# Patient Record
Sex: MALE | Race: WHITE | NOT HISPANIC OR LATINO | Employment: FULL TIME | URBAN - METROPOLITAN AREA
[De-identification: names, ages, dates, MRNs, and addresses within clinical notes are randomized per-mention and may not be internally consistent; named-entity substitution may affect disease eponyms.]

---

## 2017-10-03 ENCOUNTER — OFFICE VISIT (OUTPATIENT)
Dept: FAMILY MEDICINE | Facility: CLINIC | Age: 37
End: 2017-10-03

## 2017-10-03 VITALS
DIASTOLIC BLOOD PRESSURE: 94 MMHG | HEART RATE: 93 BPM | SYSTOLIC BLOOD PRESSURE: 122 MMHG | OXYGEN SATURATION: 76 % | TEMPERATURE: 98.1 F | HEIGHT: 75 IN | WEIGHT: 215 LBS | BODY MASS INDEX: 26.73 KG/M2

## 2017-10-03 DIAGNOSIS — Z01.818 PRE-OPERATIVE GENERAL PHYSICAL EXAMINATION: Primary | ICD-10-CM

## 2017-10-03 DIAGNOSIS — Z23 NEED FOR VACCINATION: ICD-10-CM

## 2017-10-03 DIAGNOSIS — G89.29 CHRONIC RIGHT SHOULDER PAIN: ICD-10-CM

## 2017-10-03 DIAGNOSIS — B35.1 DERMATOPHYTOSIS OF NAIL: ICD-10-CM

## 2017-10-03 DIAGNOSIS — M25.511 CHRONIC RIGHT SHOULDER PAIN: ICD-10-CM

## 2017-10-03 DIAGNOSIS — Z71.89 ACP (ADVANCE CARE PLANNING): ICD-10-CM

## 2017-10-03 LAB
ERYTHROCYTE [DISTWIDTH] IN BLOOD BY AUTOMATED COUNT: 11.5 %
HCT VFR BLD AUTO: 48 % (ref 40–53)
HEMOGLOBIN: 14.9 G/DL (ref 13.3–17.7)
MCH RBC QN AUTO: 29 PG (ref 26–33)
MCHC RBC AUTO-ENTMCNC: 31 G/DL (ref 31–36)
MCV RBC AUTO: 93 FL (ref 78–100)
PLATELET COUNT - QUEST: 302 10^9/L (ref 150–375)
RBC # BLD AUTO: 5.13 10*12/L (ref 4.4–5.9)
WBC # BLD AUTO: 4.3 10*9/L (ref 4–11)

## 2017-10-03 PROCEDURE — 90686 IIV4 VACC NO PRSV 0.5 ML IM: CPT | Performed by: PHYSICIAN ASSISTANT

## 2017-10-03 PROCEDURE — 36415 COLL VENOUS BLD VENIPUNCTURE: CPT | Performed by: PHYSICIAN ASSISTANT

## 2017-10-03 PROCEDURE — 85027 COMPLETE CBC AUTOMATED: CPT | Performed by: PHYSICIAN ASSISTANT

## 2017-10-03 PROCEDURE — 99214 OFFICE O/P EST MOD 30 MIN: CPT | Mod: 25 | Performed by: PHYSICIAN ASSISTANT

## 2017-10-03 PROCEDURE — 90471 IMMUNIZATION ADMIN: CPT | Performed by: PHYSICIAN ASSISTANT

## 2017-10-03 NOTE — PROGRESS NOTES
[facility]  [address]  [address]  [facility] [address]  [phone]  Dept: [phone]    PRE-OP EVALUATION:  Today's date: 10/3/2017    Cory Mcpherson (: 1980) presents for pre-operative evaluation assessment as requested by Dr. Finley.  He requires evaluation and anesthesia risk assessment prior to undergoing surgery/procedure for treatment of Right shoulder .  Proposed procedure: Right shoulder scope acromioplasty labral repair possible rotator cuff repair & bicep tendon    Date of Surgery/ Procedure: 10/11/2017  Time of Surgery/ Procedure: 6:30 am   Hospital/Surgical Facility[facility]  Fax number for surgical facility: [phone] attn Zulema LOAIZA  Primary Physician: Francoise Contreras  Type of Anesthesia Anticipated: to be determined    Patient has a Health Care Directive or Living Will:  NO    1. NO - Do you have a history of heart attack, stroke, stent, bypass or surgery on an artery in the head, neck, heart or legs?  2. NO - Do you ever have any pain or discomfort in your chest?  3. NO - Do you have a history of  Heart Failure?  4. NO - Are you troubled by shortness of breath when: walking on the level, up a slight hill or at night?  5. NO - Do you currently have a cold, bronchitis or other respiratory infection?  6. NO - Do you have a cough, shortness of breath or wheezing?  7. NO - Do you sometimes get pains in the calves of your legs when you walk?  8. NO - Do you or anyone in your family have previous history of blood clots?  9. NO - Do you or does anyone in your family have a serious bleeding problem such as prolonged bleeding following surgeries or cuts?  10. NO - Have you ever had problems with anemia or been told to take iron pills?  11. NO - Have you had any abnormal blood loss such as black, tarry or bloody stools, or abnormal vaginal bleeding?  12. NO - Have you ever had a blood transfusion?  13. NO - Have you or any of  your relatives ever had problems with anesthesia?  14. NO - Do you have sleep apnea, excessive snoring or daytime drowsiness?  15. NO - Do you have any prosthetic heart valves?  16. NO - Do you have prosthetic joints?          HPI:                                                      Brief HPI related to upcoming procedure: Pain in right shoulder - no trauma.  Started 4-5 years ago.       See problem list for active medical problems.  Problems all longstanding and stable, except as noted/documented.  See ROS for pertinent symptoms related to these conditions.                                                                                                  .    MEDICAL HISTORY:                                                      Patient Active Problem List    Diagnosis Date Noted     Undiagnosed cardiac murmurs      Priority: Medium     Other acne 06/24/1999     Priority: Medium     ACP (advance care planning) 10/03/2017     Priority: Low     Advance Care Planning 10/3/2017: ACP Review of Chart / Resources Provided:  Reviewed chart for advance care plan.  Cory Mcpherson has no plan or code status on file. Discussed available resources and provided with information.   Added by [facility] 01/22/2013     Priority: Low     State Tier Level:  Tier 0  Status:  n/a  Care Coordinator:  See Letters for [facility] Care Plan              Past Medical History:   Diagnosis Date     Essential hypertension, benign 8/4/2008     Other acne      Past Surgical History:   Procedure Laterality Date     NO HISTORY OF SURGERY       Current Outpatient Prescriptions   Medication Sig Dispense Refill     FINASTERIDE PO Take 1 mg by mouth       fluticasone (FLONASE) 50 MCG/ACT nasal spray Spray 1-2 sprays into both nostrils daily. 1 Package 11     OTC products: none    Allergies   Allergen Reactions     Grass      No Known Drug Allergies       Latex Allergy: NO    Social History   Substance Use Topics     Smoking  "status: Never Smoker     Smokeless tobacco: Never Used     Alcohol use 2.0 oz/week     4 drink(s) per week     History   Drug Use No       REVIEW OF SYSTEMS:                                                    Constitutional, neuro, ENT, endocrine, pulmonary, cardiac, gastrointestinal, genitourinary, musculoskeletal, integument and psychiatric systems are negative, except as otherwise noted.    Continues to have thickening of toenails R> L      EXAM:                                                    BP (!) 122/94 (BP Location: Left arm, Patient Position: Chair, Cuff Size: Adult Large)  Pulse 93  Temp 98.1  F (36.7  C) (Oral)  Ht 1.899 m (6' 2.75\")  Wt 97.5 kg (215 lb)  SpO2 (!) 76%  BMI 27.05 kg/m2    GENERAL APPEARANCE: healthy, alert and no distress     EYES: EOMI,  PERRL     HENT: ear canals and TM's normal and nose and mouth without ulcers or lesions     NECK: no adenopathy, no asymmetry, masses, or scars and thyroid normal to palpation     RESP: lungs clear to auscultation - no rales, rhonchi or wheezes     CV: regular rates and rhythm, normal S1 S2, no S3 or S4 and no murmur, click or rub     ABDOMEN:  soft, nontender, no HSM or masses and bowel sounds normal     MS: extremities normal- no gross deformities noted, no evidence of inflammation in joints, FROM in all extremities.     SKIN: no suspicious lesions or rashes - there is hyperkeratosis of bilateral 1st toe nails     NEURO: Normal strength and tone, sensory exam grossly normal, mentation intact and speech normal     PSYCH: mentation appears normal. and affect normal/bright    DIAGNOSTICS:                                                      Labs Resulted Today:   Results for orders placed or performed in visit on 10/03/17   HEMOGRAM/PLATELET (BFP)   Result Value Ref Range    WBC 4.3 4.0 - 11 10*9/L    RBC Count 5.13 4.4 - 5.9 10*12/L    Hemoglobin 14.9 13.3 - 17.7 g/dL    Hematocrit 48.0 40.0 - 53.0 %    MCV 93.0 78 - 100 fL    MCH 29.0 26 - 33 pg "    MCHC 31.0 31 - 36 g/dL    RDW 11.5 %    Platelet Count 302 150 - 375 10^9/L       Recent Labs   Lab Test  01/22/13   0915  01/22/13   0914  09/04/09   0400   HGB   --   14.9   --    PLT   --   356   --    NA  140   --   138   POTASSIUM  4.6   --   4.2   CR  0.95   --   1.06        IMPRESSION:                                                    Reason for surgery/procedure: right shoulder arthroscopy  Diagnosis/reason for consult:  Preoperative clearance      The proposed surgical procedure is considered INTERMEDIATE risk.    REVISED CARDIAC RISK INDEX  The patient has the following serious cardiovascular risks for perioperative complications such as (MI, PE, VFib and 3  AV Block):  No serious cardiac risks  INTERPRETATION: 0 risks: Class I (very low risk - 0.4% complication rate)    The patient has the following additional risks for perioperative complications:  No identified additional risks      ICD-10-CM    1. Pre-operative general physical examination Z01.818 HEMOGRAM/PLATELET (BFP)     VENOUS COLLECTION   2. Need for vaccination Z23 DTAP HEPB & POLIO VIRUS, INACTIVATED (<7Y),     VACCINE ADMINISTRATION, EACH ADDITIONAL   3. ACP (advance care planning) Z71.89    4. Dermatophytosis of nail B35.1 PODIATRY/FOOT & ANKLE SURGERY REFERRAL   5. Chronic right shoulder pain M25.511     G89.29        RECOMMENDATIONS:                                                          APPROVAL GIVEN to proceed with proposed procedure, without further diagnostic evaluation       Schedule appt with podiatry post-surgically    Signed Electronically by: Francoise Contreras PA-C    Copy of this evaluation report is provided to requesting physician.    [facility] Preop Guidelines

## 2017-10-03 NOTE — MR AVS SNAPSHOT
After Visit Summary   10/3/2017    Cory Mcpherson    MRN: [id number]           Patient Information     Date Of Birth          1980        Visit Information        Provider Department      10/3/2017 8:30 AM Francoise Contreras PA [facility]        Today's Diagnoses     Pre-operative general physical examination    -  1    Need for vaccination        ACP (advance care planning)        Dermatophytosis of nail        Chronic right shoulder pain           Follow-ups after your visit        Additional Services     PODIATRY/FOOT & ANKLE SURGERY REFERRAL       Your provider has referred you to: [facility] [phone]   [email or web address]    Please be aware that coverage of these services is subject to the terms and limitations of your health insurance plan.  Call member services at your health plan with any benefit or coverage questions.      Please bring the following to your appointment:  >>   Any x-rays, CTs or MRIs which have been performed.  Contact the facility where they were done to arrange for  prior to your scheduled appointment.  Any new CT, MRI or other procedures ordered by your specialist must be performed at a [place] facility or coordinated by your clinic's referral office.    >>   List of current medications   >>   This referral request   >>   Any documents/labs given to you for this referral                  Who to contact     If you have questions or need follow up information about today's clinic visit or your schedule please contact [facility] directly at [phone].  Normal or non-critical lab and imaging results will be communicated to you by MyChart, letter or phone within 4 business days after the clinic has received the results. If you do not hear from us within 7 days, please contact the clinic through MyChart or phone. If you have a critical or abnormal lab result,  "we will notify you by phone as soon as possible.  Submit refill requests through [employer] or call your pharmacy and they will forward the refill request to us. Please allow 3 business days for your refill to be completed.          Additional Information About Your Visit        [employer] Information     [employer] gives you secure access to your electronic health record. If you see a primary care provider, you can also send messages to your care team and make appointments. If you have questions, please call your primary care clinic.  If you do not have a primary care provider, please call [phone] and they will assist you.        Care EveryWhere ID     This is your Care EveryWhere ID. This could be used by other organizations to access your [place] medical records  [id number]        Your Vitals Were     Pulse Temperature Height Pulse Oximetry BMI (Body Mass Index)       93 98.1  F (36.7  C) (Oral) 1.899 m (6' 2.75\") 76% 27.05 kg/m2        Blood Pressure from Last 3 Encounters:   10/03/17 (!) 122/94   07/02/13 120/80   01/22/13 120/78    Weight from Last 3 Encounters:   10/03/17 97.5 kg (215 lb)   07/02/13 100.7 kg (222 lb)   01/22/13 100.2 kg (221 lb)              We Performed the Following     HC FLU VAC PRESRV FREE QUAD SPLIT VIR 3+YRS IM     HEMOGRAM/PLATELET (BFP)     PODIATRY/FOOT & ANKLE SURGERY REFERRAL     VACCINE ADMINISTRATION, EACH ADDITIONAL     VENOUS COLLECTION          Today's Medication Changes          These changes are accurate as of: 10/3/17 10:38 AM.  If you have any questions, ask your nurse or doctor.               Stop taking these medicines if you haven't already. Please contact your care team if you have questions.     mometasone 50 MCG/ACT spray   Commonly known as:  NASONEX   Stopped by:  Francoise Contreras PA                    Primary Care Provider Office Phone # Fax #    JOVANY Nowak [phone] [address]  [facility] [address]      "   Equal Access to Services     [facility] : Hadii aad ku hadcortneymarielle Marionali, washereenda luqadaha, qazainta jaysonmadelinealejandrina alvarez. So [facility] [phone].    ATENCIÓN: Si habla español, tiene a wilkinson disposición servicios gratuitos de asistencia lingüística. Llame al [phone].    We comply with applicable federal civil rights laws and [place] laws. We do not discriminate on the basis of race, color, national origin, age, disability, sex, sexual orientation, or gender identity.            Thank you!     Thank you for choosing [facility]  for your care. Our goal is always to provide you with excellent care. Hearing back from our patients is one way we can continue to improve our services. Please take a few minutes to complete the written survey that you may receive in the mail after your visit with us. Thank you!             Your Updated Medication List - Protect others around you: Learn how to safely use, store and throw away your medicines at www.disposemymeds.org.          This list is accurate as of: 10/3/17 10:38 AM.  Always use your most recent med list.                   Brand Name Dispense Instructions for use Diagnosis    FINASTERIDE PO      Take 1 mg by mouth        fluticasone 50 MCG/ACT spray    FLONASE    1 Package    Spray 1-2 sprays into both nostrils daily.    Allergic rhinitis, cause unspecified

## 2017-10-03 NOTE — LETTER
[facility]  [address]  [address]  [facility] [address]  [phone]  Dept: [phone]    PRE-OP EVALUATION:  Today's date: 10/3/2017    Cory Mcpherson (: 1980) presents for pre-operative evaluation assessment as requested by Dr. Finley.  He requires evaluation and anesthesia risk assessment prior to undergoing surgery/procedure for treatment of Right shoulder .  Proposed procedure: Right shoulder scope acromioplasty labral repair possible rotator cuff repair & bicep tendon    Date of Surgery/ Procedure: 10/11/2017  Time of Surgery/ Procedure: 6:30 am   [facility]  Fax number for surgical facility: [phone] attn Zulema LOAIZA  Primary Physician: Francoise Contreras  Type of Anesthesia Anticipated: to be determined    Patient has a Health Care Directive or Living Will:  NO    1. NO - Do you have a history of heart attack, stroke, stent, bypass or surgery on an artery in the head, neck, heart or legs?  2. NO - Do you ever have any pain or discomfort in your chest?  3. NO - Do you have a history of  Heart Failure?  4. NO - Are you troubled by shortness of breath when: walking on the level, up a slight hill or at night?  5. NO - Do you currently have a cold, bronchitis or other respiratory infection?  6. NO - Do you have a cough, shortness of breath or wheezing?  7. NO - Do you sometimes get pains in the calves of your legs when you walk?  8. NO - Do you or anyone in your family have previous history of blood clots?  9. NO - Do you or does anyone in your family have a serious bleeding problem such as prolonged bleeding following surgeries or cuts?  10. NO - Have you ever had problems with anemia or been told to take iron pills?  11. NO - Have you had any abnormal blood loss such as black, tarry or bloody stools, or abnormal vaginal bleeding?  12. NO - Have you ever had a blood transfusion?  13. NO - Have you or any of  your relatives ever had problems with anesthesia?  14. NO - Do you have sleep apnea, excessive snoring or daytime drowsiness?  15. NO - Do you have any prosthetic heart valves?  16. NO - Do you have prosthetic joints?          HPI:                                                      Brief HPI related to upcoming procedure: Pain in right shoulder - no trauma.  Started 4-5 years ago.       See problem list for active medical problems.  Problems all longstanding and stable, except as noted/documented.  See ROS for pertinent symptoms related to these conditions.                                                                                                  .    MEDICAL HISTORY:                                                      Patient Active Problem List    Diagnosis Date Noted     Undiagnosed cardiac murmurs      Priority: Medium     Other acne 06/24/1999     Priority: Medium     ACP (advance care planning) 10/03/2017     Priority: Low     Advance Care Planning 10/3/2017: ACP Review of Chart / Resources Provided:  Reviewed chart for advance care plan.  Cory Mcpherson has no plan or code status on file. Discussed available resources and provided with information.   Added by [facility] 01/22/2013     Priority: Low     State Tier Level:  Tier 0  Status:  n/a  Care Coordinator:  See Letters for [facility] Care Plan              Past Medical History:   Diagnosis Date     Essential hypertension, benign 8/4/2008     Other acne      Past Surgical History:   Procedure Laterality Date     NO HISTORY OF SURGERY       Current Outpatient Prescriptions   Medication Sig Dispense Refill     FINASTERIDE PO Take 1 mg by mouth       fluticasone (FLONASE) 50 MCG/ACT nasal spray Spray 1-2 sprays into both nostrils daily. 1 Package 11     OTC products: none    Allergies   Allergen Reactions     Grass      No Known Drug Allergies       Latex Allergy: NO    Social History   Substance Use Topics     Smoking  "status: Never Smoker     Smokeless tobacco: Never Used     Alcohol use 2.0 oz/week     4 drink(s) per week     History   Drug Use No       REVIEW OF SYSTEMS:                                                    Constitutional, neuro, ENT, endocrine, pulmonary, cardiac, gastrointestinal, genitourinary, musculoskeletal, integument and psychiatric systems are negative, except as otherwise noted.    Continues to have thickening of toenails R> L      EXAM:                                                    BP (!) 122/94 (BP Location: Left arm, Patient Position: Chair, Cuff Size: Adult Large)  Pulse 93  Temp 98.1  F (36.7  C) (Oral)  Ht 1.899 m (6' 2.75\")  Wt 97.5 kg (215 lb)  SpO2 (!) 76%  BMI 27.05 kg/m2    GENERAL APPEARANCE: healthy, alert and no distress     EYES: EOMI,  PERRL     HENT: ear canals and TM's normal and nose and mouth without ulcers or lesions     NECK: no adenopathy, no asymmetry, masses, or scars and thyroid normal to palpation     RESP: lungs clear to auscultation - no rales, rhonchi or wheezes     CV: regular rates and rhythm, normal S1 S2, no S3 or S4 and no murmur, click or rub     ABDOMEN:  soft, nontender, no HSM or masses and bowel sounds normal     MS: extremities normal- no gross deformities noted, no evidence of inflammation in joints, FROM in all extremities.     SKIN: no suspicious lesions or rashes - there is hyperkeratosis of bilateral 1st toe nails     NEURO: Normal strength and tone, sensory exam grossly normal, mentation intact and speech normal     PSYCH: mentation appears normal. and affect normal/bright    DIAGNOSTICS:                                                      Labs Resulted Today:   Results for orders placed or performed in visit on 10/03/17   HEMOGRAM/PLATELET (BFP)   Result Value Ref Range    WBC 4.3 4.0 - 11 10*9/L    RBC Count 5.13 4.4 - 5.9 10*12/L    Hemoglobin 14.9 13.3 - 17.7 g/dL    Hematocrit 48.0 40.0 - 53.0 %    MCV 93.0 78 - 100 fL    MCH 29.0 26 - 33 pg "    MCHC 31.0 31 - 36 g/dL    RDW 11.5 %    Platelet Count 302 150 - 375 10^9/L       Recent Labs   Lab Test  01/22/13   0915  01/22/13   0914  09/04/09   0400   HGB   --   14.9   --    PLT   --   356   --    NA  140   --   138   POTASSIUM  4.6   --   4.2   CR  0.95   --   1.06        IMPRESSION:                                                    Reason for surgery/procedure: right shoulder arthroscopy  Diagnosis/reason for consult:  Preoperative clearance      The proposed surgical procedure is considered INTERMEDIATE risk.    REVISED CARDIAC RISK INDEX  The patient has the following serious cardiovascular risks for perioperative complications such as (MI, PE, VFib and 3  AV Block):  No serious cardiac risks  INTERPRETATION: 0 risks: Class I (very low risk - 0.4% complication rate)    The patient has the following additional risks for perioperative complications:  No identified additional risks      ICD-10-CM    1. Pre-operative general physical examination Z01.818 HEMOGRAM/PLATELET (BFP)     VENOUS COLLECTION   2. Need for vaccination Z23 DTAP HEPB & POLIO VIRUS, INACTIVATED (<7Y),     VACCINE ADMINISTRATION, EACH ADDITIONAL   3. ACP (advance care planning) Z71.89    4. Dermatophytosis of nail B35.1 PODIATRY/FOOT & ANKLE SURGERY REFERRAL   5. Chronic right shoulder pain M25.511     G89.29        RECOMMENDATIONS:                                                          APPROVAL GIVEN to proceed with proposed procedure, without further diagnostic evaluation       Schedule appt with podiatry post-surgically    Signed Electronically by: Francoise Contreras PA-C    Copy of this evaluation report is provided to requesting physician.    [facility] Preop Guidelines

## 2017-11-14 ENCOUNTER — OFFICE VISIT (OUTPATIENT)
Dept: PODIATRY | Facility: CLINIC | Age: 37
End: 2017-11-14
Payer: COMMERCIAL

## 2017-11-14 VITALS
SYSTOLIC BLOOD PRESSURE: 139 MMHG | HEIGHT: 75 IN | HEART RATE: 87 BPM | BODY MASS INDEX: 26.73 KG/M2 | DIASTOLIC BLOOD PRESSURE: 85 MMHG | WEIGHT: 215 LBS

## 2017-11-14 DIAGNOSIS — B35.1 ONYCHOMYCOSIS: Primary | ICD-10-CM

## 2017-11-14 PROCEDURE — 99203 OFFICE O/P NEW LOW 30 MIN: CPT | Performed by: PODIATRIST

## 2017-11-14 PROCEDURE — 84450 TRANSFERASE (AST) (SGOT): CPT | Performed by: PODIATRIST

## 2017-11-14 PROCEDURE — 36415 COLL VENOUS BLD VENIPUNCTURE: CPT | Performed by: PODIATRIST

## 2017-11-14 PROCEDURE — 84460 ALANINE AMINO (ALT) (SGPT): CPT | Performed by: PODIATRIST

## 2017-11-14 RX ORDER — IBUPROFEN 800 MG/1
TABLET, FILM COATED ORAL
Refills: 0 | COMMUNITY
Start: 2017-10-11 | End: 2023-07-11

## 2017-11-14 NOTE — MR AVS SNAPSHOT
After Visit Summary   11/14/2017    Cory Mcpherson    MRN: [id number]           Patient Information     Date Of Birth          1980        Visit Information        Provider Department      11/14/2017 10:30 AM Richmond Raza DPM [facility]        Today's Diagnoses     Onychomycosis    -  1      Care Instructions    NAIL FUNGUS / ONYCHOMYCOSIS   Nail fungus is not a hygiene problem and will not likely lead to significant medical   problems. The nails may get thick causing pain and possibly local skin infection.   Treatments include debridement (trimming), oral antifungals, topical antifungals and complete removal of the nail. Most fungal nails are not treated.   Topicals such as tea tree oil can be helpful for surface fungus and may, at best, limit   progression. Over the counter creams (such as Lamisil) can also be used however, their effectiveness is also quite low.  Topical treatment with Pen lac is expensive and often not covered by insurance. Pen lac has an approximate 8% success rate. Topical therapy recommendations is to apply twice a day for at least 3-4 months as it takes 9 months for new nail to grow out.    Experts suggest soaking your feet for 15 to 20 minutes in a mixture of 1 cup vinegar to 4 cups warm water. Be sure to rinse well and pat your feet dry when you're done. You can soak your feet like this daily. But if your skin becomes irritated, try soaking only two to three times a week. Vicks VapoRub, as with vinegar, there have been no controlled clinical trials to assess the effectiveness of Vicks VapoRub on nail fungus, but there have been numerous anecdotal reports that it works. There's no consensus on how often to apply this product, so check with your doctor before using it on your nails.     Oral therapies include Sporanox and Lamisil. Oral therapies are also expensive and not very effective. Side effects such as liver disease are the main concern. Return  of fungus is common even if the treatment worked.     Other Tips:  - Penlac nail medication apply daily x 4 months; remove old polish first day of each week  - Antifungal cream/powder (Zeasorb) - apply daily to feet and shoes x 2 months  - Clean shoes with Lysol or in washing machine every few weeks  - Rotate shoe gear; give them 24 hours to dry out between days wearing them  - Clean pair of socks in morning, clean pair in afternoon if your feet sweat  - Shower shoes used in public showers/pools            Follow-ups after your visit        Follow-up notes from your care team     Return if symptoms worsen or fail to improve.      Who to contact     If you have questions or need follow up information about today's clinic visit or your schedule please contact [facility] directly at [phone].  Normal or non-critical lab and imaging results will be communicated to you by FIXOhart, letter or phone within 4 business days after the clinic has received the results. If you do not hear from us within 7 days, please contact the clinic through FIXOhart or phone. If you have a critical or abnormal lab result, we will notify you by phone as soon as possible.  Submit refill requests through YourSports or call your pharmacy and they will forward the refill request to us. Please allow 3 business days for your refill to be completed.          Additional Information About Your Visit        FIXOharUrban Matrix Information     YourSports gives you secure access to your electronic health record. If you see a primary care provider, you can also send messages to your care team and make appointments. If you have questions, please call your primary care clinic.  If you do not have a primary care provider, please call [phone] and they will assist you.        Care EveryWhere ID     This is your Care EveryWhere ID. This could be used by other organizations to access your [place] medical records  [id number]        Your Vitals Were     Pulse  "Height BMI (Body Mass Index)             87 6' 2.75\" (1.899 m) 27.05 kg/m2          Blood Pressure from Last 3 Encounters:   11/14/17 139/85   10/03/17 (!) 122/94   07/02/13 120/80    Weight from Last 3 Encounters:   11/14/17 215 lb (97.5 kg)   10/03/17 215 lb (97.5 kg)   07/02/13 222 lb (100.7 kg)              Today, you had the following     No orders found for display       Primary Care Provider Office Phone # Fax #    JOVANY Nowak [phone] [address]  [facility] [address]        Equal Access to Services     BETTY BLOOM : Hadii lawrence baireso Somat, waaxda luqadaha, qaybta kaalmada adeegyada, alejandrina de jesus . So [facility] [phone].    ATENCIÓN: Si habla español, tiene a wilkinson disposición servicios gratuitos de asistencia lingüística. [facility] [phone].    We comply with applicable federal civil rights laws and [place] laws. We do not discriminate on the basis of race, color, national origin, age, disability, sex, sexual orientation, or gender identity.            Thank you!     Thank you for choosing [facility]  for your care. Our goal is always to provide you with excellent care. Hearing back from our patients is one way we can continue to improve our services. Please take a few minutes to complete the written survey that you may receive in the mail after your visit with us. Thank you!             Your Updated Medication List - Protect others around you: Learn how to safely use, store and throw away your medicines at www.disposemymeds.org.          This list is accurate as of: 11/14/17 10:55 AM.  Always use your most recent med list.                   Brand Name Dispense Instructions for use Diagnosis    FINASTERIDE PO      Take 1 mg by mouth        fluticasone 50 MCG/ACT spray    FLONASE    1 Package    Spray 1-2 sprays into both nostrils daily.    Allergic rhinitis, cause unspecified       ibuprofen 800 MG tablet    " ADVIL/MOTRIN     TAKE 1 TABLET BY MOUTH 3 TIMES DAILY AS NEEDED

## 2017-11-14 NOTE — PROGRESS NOTES
Weight management plan: Patient was referred to their PCP to discuss a diet and exercise plan.     ALONSO Burr MA November 14, 2017 10:39 AM

## 2017-11-14 NOTE — PROGRESS NOTES
PATIENT HISTORY:  Cory Mcpherson is a 37 year old male who presents to clinic for toenail thickening and discoloration, moreso on R.  4 year duration.  He is a  and has had hx of great toenail nail injury in the past, though he is unsure if this is the cause.  No pain today.  Some pain with pressure of nails at times.  He has tried otc topicals w/o much improvement.  Reports taking an oral antifungal for a skin condition in the past and noted this helped his nails a bit.    Review of Systems:  Patient denies fever, chills, rash, wound, stiffness, limping, numbness, weakness, heart burn, blood in stool, chest pain with activity, calf pain when walking, shortness of breath with activity, chronic cough, easy bleeding/bruising, swelling of ankles, excessive thirst, fatigue, depression, anxiety.       PAST MEDICAL HISTORY:   Past Medical History:   Diagnosis Date     Essential hypertension, benign 8/4/2008     Other acne         PAST SURGICAL HISTORY:   Past Surgical History:   Procedure Laterality Date     NO HISTORY OF SURGERY          MEDICATIONS:   Current Outpatient Prescriptions:      ibuprofen (ADVIL/MOTRIN) 800 MG tablet, TAKE 1 TABLET BY MOUTH 3 TIMES DAILY AS NEEDED, Disp: , Rfl: 0     FINASTERIDE PO, Take 1 mg by mouth, Disp: , Rfl:      fluticasone (FLONASE) 50 MCG/ACT nasal spray, Spray 1-2 sprays into both nostrils daily. (Patient not taking: Reported on 11/14/2017), Disp: 1 Package, Rfl: 11     ALLERGIES:    Allergies   Allergen Reactions     Grass      No Known Drug Allergies         SOCIAL HISTORY:   Social History     Social History     Marital status:      Spouse name: Sunita     Number of children: 2     Years of education: 14     Occupational History     Computer tech [employer]     Social History Main Topics     Smoking status: Never Smoker     Smokeless tobacco: Never Used     Alcohol use 2.0 oz/week     4 drink(s) per week     Drug use: No     Sexual activity: Yes      "Partners: Female     Birth control/ protection: Pill     Other Topics Concern     Exercise Yes     4 days week jogging     Seat Belt Yes     Self-Exams Yes     Social History Narrative                 FAMILY HISTORY:   Family History   Problem Relation Age of Onset     Hypertension Mother      High cholesterol Father      C.A.D. Father      stents     CANCER Paternal Grandmother      lung     CANCER Paternal Grandfather      lung     DIABETES No family hx of      Cancer - colorectal No family hx of      Prostate Cancer No family hx of         EXAM:Vitals: /85 (BP Location: Left arm, Patient Position: Chair, Cuff Size: Adult Regular)  Pulse 87  Ht 6' 2.75\" (1.899 m)  Wt 215 lb (97.5 kg)  BMI 27.05 kg/m2  BMI= Body mass index is 27.05 kg/(m^2).    General appearance: Patient is alert and fully cooperative with history & exam.  No sign of distress is noted during the visit.     Psychiatric: Affect is pleasant & appropriate.  Patient appears motivated to improve health.     Respiratory: Breathing is regular & unlabored while sitting.     HEENT: Hearing is intact to spoken word.  Speech is clear.  No gross evidence of visual impairment that would impact ambulation.     Dermatologic: Right toenails thickened, dystrophic, discolored, moreso right hallux nail.  Slight changes to left hallux nail.  Skin intact to feet. No paronychia or evidence of soft tissue infection is noted.     Vascular: DP & PT pulses are intact & regular bilaterally.  No significant edema or varicosities noted.  CFT and skin temperature are normal to both lower extremities.     Neurologic: Lower extremity sensation is intact to light touch.  No evidence of weakness or contracture in the lower extremities.  No evidence of neuropathy.     Musculoskeletal: Patient is ambulatory without assistive device or brace.  No gross ankle deformity noted.  No foot or ankle joint effusion is noted.     ASSESSMENT: Onychomycosis     PLAN:  Reviewed " patient's chart in epic.  Discussed condition and treatment options including pros and cons.    Discussed causes of nail fungus.  Discussed treatment options with patient and explained that there isn't one treatment that is 100% effective.  Debridement is an option.  Discussed oral lamisil which is the most effective but can have liver effects.  Discussed over the counter antifungal creams.  Explained that these are less effective and need to be applied twice a day for about 3-4 months.  Also talked about prescription topicals, which have similar efficacy.  Also discussed that if there was damage to the nail and the nail is now dystrophic that none of the above is going to change the nail.  Discussed that if it becomes painful, we can remove the nail in clinic.  The patient elected to try oral Lamisil.  Will check liver function prior, ALT/AST.  F/u on results.  Plan pulse dosing.  Advised avoiding alcohol while on this medication.    Richmond Raza, ABDON, FACFAS

## 2017-11-14 NOTE — LETTER
11/14/2017         RE: Cory Mcpherson  [address] [facility] [address]        Dear Colleague,    Thank you for referring your patient, Cory Mcpherson, to the [facility]. Please see a copy of my visit note below.    Weight management plan: Patient was referred to their PCP to discuss a diet and exercise plan.     ALONSO Burr MA November 14, 2017 10:39 AM      PATIENT HISTORY:  Cory Mcpherson is a 37 year old male who presents to clinic for toenail thickening and discoloration, moreso on R.  4 year duration.  He is a  and has had hx of great toenail nail injury in the past, though he is unsure if this is the cause.  No pain today.  Some pain with pressure of nails at times.  He has tried otc topicals w/o much improvement.  Reports taking an oral antifungal for a skin condition in the past and noted this helped his nails a bit.    Review of Systems:  Patient denies fever, chills, rash, wound, stiffness, limping, numbness, weakness, heart burn, blood in stool, chest pain with activity, calf pain when walking, shortness of breath with activity, chronic cough, easy bleeding/bruising, swelling of ankles, excessive thirst, fatigue, depression, anxiety.       PAST MEDICAL HISTORY:   Past Medical History:   Diagnosis Date     Essential hypertension, benign 8/4/2008     Other acne         PAST SURGICAL HISTORY:   Past Surgical History:   Procedure Laterality Date     NO HISTORY OF SURGERY          MEDICATIONS:   Current Outpatient Prescriptions:      ibuprofen (ADVIL/MOTRIN) 800 MG tablet, TAKE 1 TABLET BY MOUTH 3 TIMES DAILY AS NEEDED, Disp: , Rfl: 0     FINASTERIDE PO, Take 1 mg by mouth, Disp: , Rfl:      fluticasone (FLONASE) 50 MCG/ACT nasal spray, Spray 1-2 sprays into both nostrils daily. (Patient not taking: Reported on 11/14/2017), Disp: 1 Package, Rfl: 11     ALLERGIES:    Allergies   Allergen Reactions     Grass      No Known Drug Allergies         SOCIAL HISTORY:   Social  "History     Social History     Marital status:      Spouse name: Sunita     Number of children: 2     Years of education: 14     Occupational History      [employer]     Social History Main Topics     Smoking status: Never Smoker     Smokeless tobacco: Never Used     Alcohol use 2.0 oz/week     4 drink(s) per week     Drug use: No     Sexual activity: Yes     Partners: Female     Birth control/ protection: Pill     Other Topics Concern     Exercise Yes     4 days week jogging     Seat Belt Yes     Self-Exams Yes     Social History Narrative                 FAMILY HISTORY:   Family History   Problem Relation Age of Onset     Hypertension Mother      High cholesterol Father      C.A.D. Father      stents     CANCER Paternal Grandmother      lung     CANCER Paternal Grandfather      lung     DIABETES No family hx of      Cancer - colorectal No family hx of      Prostate Cancer No family hx of         EXAM:Vitals: /85 (BP Location: Left arm, Patient Position: Chair, Cuff Size: Adult Regular)  Pulse 87  Ht 6' 2.75\" (1.899 m)  Wt 215 lb (97.5 kg)  BMI 27.05 kg/m2  BMI= Body mass index is 27.05 kg/(m^2).    General appearance: Patient is alert and fully cooperative with history & exam.  No sign of distress is noted during the visit.     Psychiatric: Affect is pleasant & appropriate.  Patient appears motivated to improve health.     Respiratory: Breathing is regular & unlabored while sitting.     HEENT: Hearing is intact to spoken word.  Speech is clear.  No gross evidence of visual impairment that would impact ambulation.     Dermatologic: Right toenails thickened, dystrophic, discolored, moreso right hallux nail.  Slight changes to left hallux nail.  Skin intact to feet. No paronychia or evidence of soft tissue infection is noted.     Vascular: DP & PT pulses are intact & regular bilaterally.  No significant edema or varicosities noted.  CFT and skin temperature are normal to both lower " extremities.     Neurologic: Lower extremity sensation is intact to light touch.  No evidence of weakness or contracture in the lower extremities.  No evidence of neuropathy.     Musculoskeletal: Patient is ambulatory without assistive device or brace.  No gross ankle deformity noted.  No foot or ankle joint effusion is noted.     ASSESSMENT: Onychomycosis     PLAN:  Reviewed patient's chart in epic.  Discussed condition and treatment options including pros and cons.    Discussed causes of nail fungus.  Discussed treatment options with patient and explained that there isn't one treatment that is 100% effective.  Debridement is an option.  Discussed oral lamisil which is the most effective but can have liver effects.  Discussed over the counter antifungal creams.  Explained that these are less effective and need to be applied twice a day for about 3-4 months.  Also talked about prescription topicals, which have similar efficacy.  Also discussed that if there was damage to the nail and the nail is now dystrophic that none of the above is going to change the nail.  Discussed that if it becomes painful, we can remove the nail in clinic.  The patient elected to try oral Lamisil.  Will check liver function prior, ALT/AST.  F/u on results.  Plan pulse dosing.  Advised avoiding alcohol while on this medication.    Richmond Raza DPM, FACFAS      Again, thank you for allowing me to participate in the care of your patient.        Sincerely,        Richmond Raza DPM

## 2017-11-15 LAB
ALT SERPL W P-5'-P-CCNC: 58 U/L (ref 0–70)
AST SERPL W P-5'-P-CCNC: 33 U/L (ref 0–45)

## 2017-11-17 ENCOUNTER — TELEPHONE (OUTPATIENT)
Dept: PODIATRY | Facility: CLINIC | Age: 37
End: 2017-11-17

## 2017-11-17 DIAGNOSIS — B35.1 ONYCHOMYCOSIS: Primary | ICD-10-CM

## 2017-11-17 RX ORDER — TERBINAFINE HYDROCHLORIDE 250 MG/1
TABLET ORAL
Qty: 42 TABLET | Refills: 0 | Status: SHIPPED | OUTPATIENT
Start: 2017-11-17 | End: 2018-08-11

## 2018-08-11 ENCOUNTER — OFFICE VISIT (OUTPATIENT)
Dept: FAMILY MEDICINE | Facility: CLINIC | Age: 38
End: 2018-08-11

## 2018-08-11 ENCOUNTER — TELEPHONE (OUTPATIENT)
Dept: FAMILY MEDICINE | Facility: CLINIC | Age: 38
End: 2018-08-11

## 2018-08-11 VITALS
DIASTOLIC BLOOD PRESSURE: 76 MMHG | WEIGHT: 216 LBS | OXYGEN SATURATION: 98 % | BODY MASS INDEX: 27.18 KG/M2 | TEMPERATURE: 98.2 F | SYSTOLIC BLOOD PRESSURE: 110 MMHG | HEART RATE: 62 BPM

## 2018-08-11 DIAGNOSIS — Z13.228 SCREENING FOR METABOLIC DISORDER: ICD-10-CM

## 2018-08-11 DIAGNOSIS — Z13.220 SCREENING FOR LIPID DISORDERS: ICD-10-CM

## 2018-08-11 DIAGNOSIS — L65.9 HAIR LOSS: Primary | ICD-10-CM

## 2018-08-11 PROCEDURE — 80061 LIPID PANEL: CPT | Mod: 90 | Performed by: PHYSICIAN ASSISTANT

## 2018-08-11 PROCEDURE — 36415 COLL VENOUS BLD VENIPUNCTURE: CPT | Performed by: PHYSICIAN ASSISTANT

## 2018-08-11 PROCEDURE — 80053 COMPREHEN METABOLIC PANEL: CPT | Mod: 90 | Performed by: PHYSICIAN ASSISTANT

## 2018-08-11 PROCEDURE — 99213 OFFICE O/P EST LOW 20 MIN: CPT | Performed by: PHYSICIAN ASSISTANT

## 2018-08-11 RX ORDER — FINASTERIDE 1 MG/1
1 TABLET, FILM COATED ORAL DAILY
Qty: 90 TABLET | Refills: 3 | Status: SHIPPED | OUTPATIENT
Start: 2018-08-11 | End: 2019-08-20

## 2018-08-11 NOTE — MR AVS SNAPSHOT
After Visit Summary   8/11/2018    Cory Mcpherson    MRN: [id number]           Patient Information     Date Of Birth          1980        Visit Information        Provider Department      8/11/2018 10:00 AM Ivette Lovell PA-C [facility]        Today's Diagnoses     Hair loss    -  1    Screening for lipid disorders        Screening for metabolic disorder           Follow-ups after your visit        Follow-up notes from your care team     Return in about 1 year (around 8/11/2019).      Who to contact     If you have questions or need follow up information about today's clinic visit or your schedule please contact [facility] directly at [phone].  Normal or non-critical lab and imaging results will be communicated to you by Silveradohart, letter or phone within 4 business days after the clinic has received the results. If you do not hear from us within 7 days, please contact the clinic through Silveradohart or phone. If you have a critical or abnormal lab result, we will notify you by phone as soon as possible.  Submit refill requests through [employer] or call your pharmacy and they will forward the refill request to us. Please allow 3 business days for your refill to be completed.          Additional Information About Your Visit        MyChart Information     [employer] gives you secure access to your electronic health record. If you see a primary care provider, you can also send messages to your care team and make appointments. If you have questions, please call your primary care clinic.  If you do not have a primary care provider, please call [phone] and they will assist you.        Care EveryWhere ID     This is your Care EveryWhere ID. This could be used by other organizations to access your [place] medical records  [id number]        Your Vitals Were     Pulse Temperature Pulse Oximetry BMI (Body Mass Index)          62 98.2  F (36.8  C)  (Oral) 98% 27.18 kg/m2         Blood Pressure from Last 3 Encounters:   08/11/18 110/76   11/14/17 139/85   10/03/17 (!) 122/94    Weight from Last 3 Encounters:   08/11/18 98 kg (216 lb)   11/14/17 97.5 kg (215 lb)   10/03/17 97.5 kg (215 lb)              We Performed the Following     COMPREHENSIVE METABOLIC PANEL (QUEST) XCMP     Lipid Profile (QUEST)     VENOUS COLLECTION          Today's Medication Changes          These changes are accurate as of 8/11/18 11:59 PM.  If you have any questions, ask your nurse or doctor.               These medicines have changed or have updated prescriptions.        Dose/Directions    finasteride 1 MG tablet   Commonly known as:  PROPECIA   This may have changed:    - medication strength  - when to take this   Used for:  Hair loss   Changed by:  Ivette Lovell PA-C        Dose:  1 mg   Take 1 tablet (1 mg) by mouth daily   Quantity:  90 tablet   Refills:  3            Where to get your medicines      These medications were sent to [id number] - [place], [place] - [address] [address]  [address] [facility] [address]     Phone:  [phone]     finasteride 1 MG tablet                Primary Care Provider Office Phone # Fax #    JOVANY Nowak [phone] [address]  [facility] [address]        Equal Access to Services     BETTY BLOOM AH: Hadii lawrence ku hadasho Sojessicaali, waaxda luqadaha, qaybta kaalmada adeegyada, alejandrina de jesus . So [facility] [phone].    ATENCIÓN: Si habla español, tiene a wilkinson disposición servicios gratuitos de asistencia lingüística. Juliana al [phone].    We comply with applicable federal civil rights laws and [place] laws. We do not discriminate on the basis of race, color, national origin, age, disability, sex, sexual orientation, or gender identity.            Thank you!     Thank you for choosing [facility]  for your care. Our goal is always  to provide you with excellent care. Hearing back from our patients is one way we can continue to improve our services. Please take a few minutes to complete the written survey that you may receive in the mail after your visit with us. Thank you!             Your Updated Medication List - Protect others around you: Learn how to safely use, store and throw away your medicines at www.disposemymeds.org.          This list is accurate as of 8/11/18 11:59 PM.  Always use your most recent med list.                   Brand Name Dispense Instructions for use Diagnosis    finasteride 1 MG tablet    PROPECIA    90 tablet    Take 1 tablet (1 mg) by mouth daily    Hair loss       fluticasone 50 MCG/ACT spray    FLONASE    1 Package    Spray 1-2 sprays into both nostrils daily.    Allergic rhinitis, cause unspecified       ibuprofen 800 MG tablet    ADVIL/MOTRIN     TAKE 1 TABLET BY MOUTH 3 TIMES DAILY AS NEEDED

## 2018-08-11 NOTE — PROGRESS NOTES
CC: Medication Check    History:  Cory is here today for refills of finasteride. He was initially started on this by his hair specialist. His hair specialist has since closed practice, as far as Cory can tell. He is happy with the finasteride. Denies any side effects.    Cory is also due for fasting labs, and happens to be fasting this morning. He would like to check these as well.      PMH, MEDICATIONS, ALLERGIES, SOCIAL AND FAMILY HISTORY in [facility] and reviewed by me personally.      ROS negative other than the symptoms noted above in the HPI.        Examination   /76 (BP Location: Left arm, Patient Position: Sitting, Cuff Size: Adult Large)  Pulse 62  Temp 98.2  F (36.8  C) (Oral)  Wt 98 kg (216 lb)  SpO2 98%  BMI 27.18 kg/m2       Constitutional: Sitting comfortably, in no acute distress. Vital signs noted  Cardiovascular:  regular rate and rhythm, no murmurs, clicks, or gallops  Respiratory:  normal respiratory rate and rhythm, lungs clear to auscultation  Psychiatric: mentation appears normal and affect normal/bright        A/P    ICD-10-CM    1. Screening for lipid disorders Z13.220 VENOUS COLLECTION     Lipid Profile (QUEST)   2. Screening for metabolic disorder Z13.228 VENOUS COLLECTION     COMPREHENSIVE METABOLIC PANEL (QUEST) XCMP   3. Hair loss L65.9 finasteride (PROPECIA) 1 MG tablet       DISCUSSION:  Refilled finasteride for 1 year as patient is tolerating well and happy with effects. Warned of need to continue medication or prevention of hair loss can quickly reverse.     Will check fasting labs and contact via [employer] with results when available.     follow up visit: 1 year    Ivette Lovell PA-C  [facility]

## 2018-08-11 NOTE — NURSING NOTE
Cory is here for med check    Pre-visit Screening:  Immunizations:  up to date  Colonoscopy:  NA  Mammogram: NA  Asthma Action Test/Plan:  NA  PHQ9:  None  GAD7:  None  Questioned patient about current smoking habits Pt. has never smoked.  Ok to leave detailed message on voice mail for today's visit only Yes, phone # [phone]

## 2018-08-11 NOTE — TELEPHONE ENCOUNTER
Cory left a message stating that he went to fill his hair loss prescription and found out the specialty provider he sees has retired.  He is wondering if you can prescribe a small amount until he can be seen here or by another specialist?  He stated the pharmacy would send over the prescription which we have not received yet so I am not sure what he is taking but thought I would give a heads up    Patient's phone #[phone]

## 2018-08-12 LAB
ALBUMIN SERPL-MCNC: 4.7 G/DL (ref 3.6–5.1)
ALBUMIN/GLOB SERPL: 1.7 (CALC) (ref 1–2.5)
ALP SERPL-CCNC: 54 U/L (ref 40–115)
ALT SERPL-CCNC: 28 U/L (ref 9–46)
AST SERPL-CCNC: 23 U/L (ref 10–40)
BILIRUB SERPL-MCNC: 0.6 MG/DL (ref 0.2–1.2)
BUN SERPL-MCNC: 18 MG/DL (ref 7–25)
BUN/CREATININE RATIO: NORMAL (CALC) (ref 6–22)
CALCIUM SERPL-MCNC: 9.9 MG/DL (ref 8.6–10.3)
CHLORIDE SERPLBLD-SCNC: 103 MMOL/L (ref 98–110)
CHOLEST SERPL-MCNC: 183 MG/DL
CHOLEST/HDLC SERPL: 3.7 (CALC)
CO2 SERPL-SCNC: 27 MMOL/L (ref 20–32)
CREAT SERPL-MCNC: 1.05 MG/DL (ref 0.6–1.35)
EGFR AFRICAN AMERICAN - QUEST: 103 ML/MIN/1.73M2
GFR SERPL CREATININE-BSD FRML MDRD: 89 ML/MIN/1.73M2
GLOBULIN, CALCULATED - QUEST: 2.8 G/DL (CALC) (ref 1.9–3.7)
GLUCOSE - QUEST: 86 MG/DL (ref 65–99)
HDLC SERPL-MCNC: 49 MG/DL
LDLC SERPL CALC-MCNC: 116 MG/DL (CALC)
NONHDLC SERPL-MCNC: 134 MG/DL (CALC)
POTASSIUM SERPL-SCNC: 5 MMOL/L (ref 3.5–5.3)
PROT SERPL-MCNC: 7.5 G/DL (ref 6.1–8.1)
SODIUM SERPL-SCNC: 139 MMOL/L (ref 135–146)
TRIGL SERPL-MCNC: 82 MG/DL

## 2018-08-13 NOTE — TELEPHONE ENCOUNTER
This looks like a phone call from before the weekend. I saw this pt at [facility] and prescribed him finasteride as he requested for the whole year given his retiring hair specialist. Please call pt to ensure there was some sort of mix up- rx was sent and confirmed through [email or web address] [place].

## 2018-09-27 ENCOUNTER — OFFICE VISIT (OUTPATIENT)
Dept: FAMILY MEDICINE | Facility: CLINIC | Age: 38
End: 2018-09-27

## 2018-09-27 VITALS
DIASTOLIC BLOOD PRESSURE: 82 MMHG | BODY MASS INDEX: 26.68 KG/M2 | WEIGHT: 212 LBS | HEART RATE: 80 BPM | TEMPERATURE: 98.2 F | SYSTOLIC BLOOD PRESSURE: 132 MMHG | OXYGEN SATURATION: 98 %

## 2018-09-27 DIAGNOSIS — T14.8XXA LIGAMENT TEAR: ICD-10-CM

## 2018-09-27 DIAGNOSIS — Z01.818 PRE-OP EXAM: Primary | ICD-10-CM

## 2018-09-27 DIAGNOSIS — Z23 NEED FOR VACCINATION: ICD-10-CM

## 2018-09-27 LAB — HEMOGLOBIN: 15.3 G/DL (ref 13.3–17.7)

## 2018-09-27 PROCEDURE — 90471 IMMUNIZATION ADMIN: CPT | Performed by: PHYSICIAN ASSISTANT

## 2018-09-27 PROCEDURE — 36415 COLL VENOUS BLD VENIPUNCTURE: CPT | Performed by: PHYSICIAN ASSISTANT

## 2018-09-27 PROCEDURE — 85018 HEMOGLOBIN: CPT | Performed by: PHYSICIAN ASSISTANT

## 2018-09-27 PROCEDURE — 99214 OFFICE O/P EST MOD 30 MIN: CPT | Mod: 25 | Performed by: PHYSICIAN ASSISTANT

## 2018-09-27 PROCEDURE — 90686 IIV4 VACC NO PRSV 0.5 ML IM: CPT | Performed by: PHYSICIAN ASSISTANT

## 2018-09-27 NOTE — MR AVS SNAPSHOT
After Visit Summary   9/27/2018    Cory Mcpherson    MRN: [id number]           Patient Information     Date Of Birth          1980        Visit Information        Provider Department      9/27/2018 5:00 PM Francoise Contreras PA [facility]        Today's Diagnoses     Pre-op exam    -  1    Ligament tear           Follow-ups after your visit        Who to contact     If you have questions or need follow up information about today's clinic visit or your schedule please contact [facility] directly at [phone].  Normal or non-critical lab and imaging results will be communicated to you by Soufunhart, letter or phone within 4 business days after the clinic has received the results. If you do not hear from us within 7 days, please contact the clinic through [employer] or phone. If you have a critical or abnormal lab result, we will notify you by phone as soon as possible.  Submit refill requests through [employer] or call your pharmacy and they will forward the refill request to us. Please allow 3 business days for your refill to be completed.          Additional Information About Your Visit        MyChart Information     [employer] gives you secure access to your electronic health record. If you see a primary care provider, you can also send messages to your care team and make appointments. If you have questions, please call your primary care clinic.  If you do not have a primary care provider, please call [phone] and they will assist you.        Care EveryWhere ID     This is your Care EveryWhere ID. This could be used by other organizations to access your [place] medical records  [id number]        Your Vitals Were     Pulse Temperature Pulse Oximetry BMI (Body Mass Index)          80 98.2  F (36.8  C) (Oral) 98% 26.68 kg/m2         Blood Pressure from Last 3 Encounters:   09/27/18 132/82   08/11/18 110/76   11/14/17 139/85    Weight from Last  3 Encounters:   09/27/18 96.2 kg (212 lb)   08/11/18 98 kg (216 lb)   11/14/17 97.5 kg (215 lb)              We Performed the Following     CL AFF HEMOGLOBIN (BFP)     VENOUS COLLECTION        Primary Care Provider Office Phone # Fax #    JOVANY Nowak [phone] [address]  [facility] [address]        Equal Access to Services     RICARDO BLOOM : Hadii aad ku hadasho Soomaali, waaxda luqadaha, qaybta kaalmada adeegyada, waxay idiin hayaan adeeg khcorinnamagdiel laamy . So [facility] [phone].    ATENCIÓN: Si dariola espdina, tiene a wilkinson disposición servicios gratuitos de asistencia lingüística. Llame al [phone].    We comply with applicable federal civil rights laws and [place] laws. We do not discriminate on the basis of race, color, national origin, age, disability, sex, sexual orientation, or gender identity.            Thank you!     Thank you for choosing [facility]  for your care. Our goal is always to provide you with excellent care. Hearing back from our patients is one way we can continue to improve our services. Please take a few minutes to complete the written survey that you may receive in the mail after your visit with us. Thank you!             Your Updated Medication List - Protect others around you: Learn how to safely use, store and throw away your medicines at www.disposemymeds.org.          This list is accurate as of 9/27/18  5:48 PM.  Always use your most recent med list.                   Brand Name Dispense Instructions for use Diagnosis    finasteride 1 MG tablet    PROPECIA    90 tablet    Take 1 tablet (1 mg) by mouth daily    Hair loss       fluticasone 50 MCG/ACT spray    FLONASE    1 Package    Spray 1-2 sprays into both nostrils daily.    Allergic rhinitis, cause unspecified       ibuprofen 800 MG tablet    ADVIL/MOTRIN     TAKE 1 TABLET BY MOUTH 3 TIMES DAILY AS NEEDED
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
## 2018-11-15 ENCOUNTER — OFFICE VISIT (OUTPATIENT)
Dept: FAMILY MEDICINE | Facility: CLINIC | Age: 38
End: 2018-11-15

## 2018-11-15 VITALS
BODY MASS INDEX: 26.93 KG/M2 | HEART RATE: 75 BPM | SYSTOLIC BLOOD PRESSURE: 132 MMHG | DIASTOLIC BLOOD PRESSURE: 84 MMHG | WEIGHT: 214 LBS | TEMPERATURE: 98.1 F | OXYGEN SATURATION: 98 %

## 2018-11-15 DIAGNOSIS — R10.32 LEFT GROIN PAIN: Primary | ICD-10-CM

## 2018-11-15 PROCEDURE — 73502 X-RAY EXAM HIP UNI 2-3 VIEWS: CPT | Performed by: PHYSICIAN ASSISTANT

## 2018-11-15 PROCEDURE — 99213 OFFICE O/P EST LOW 20 MIN: CPT | Performed by: PHYSICIAN ASSISTANT

## 2018-11-15 NOTE — MR AVS SNAPSHOT
After Visit Summary   11/15/2018    Cory Mcpherson    MRN: [id number]           Patient Information     Date Of Birth          1980        Visit Information        Provider Department      11/15/2018 3:30 PM Francoise Contreras PA [facility]        Today's Diagnoses     Left groin pain    -  1       Follow-ups after your visit        Additional Services     ORTHOPEDICS ADULT REFERRAL       Your provider has referred you to: [facility] [phone]   [email or web address]    Please be aware that coverage of these services is subject to the terms and limitations of your health insurance plan.  Call member services at your health plan with any benefit or coverage questions.      Please bring the following to your appointment:    >>   Any x-rays, CTs or MRIs which have been performed.  Contact the facility where they were done to arrange for  prior to your scheduled appointment.  Any new CT, MRI or other procedures ordered by your specialist must be performed at a [facility] facility or coordinated by your clinic's referral office.    >>   List of current medications   >>   This referral request   >>   Any documents/labs given to you for this referral                  Who to contact     If you have questions or need follow up information about today's clinic visit or your schedule please contact [facility] directly at [phone].  Normal or non-critical lab and imaging results will be communicated to you by MyChart, letter or phone within 4 business days after the clinic has received the results. If you do not hear from us within 7 days, please contact the clinic through MyChart or phone. If you have a critical or abnormal lab result, we will notify you by phone as soon as possible.  Submit refill requests through [employer] or call your pharmacy and they will forward the refill request to  us. Please allow 3 business days for your refill to be completed.          Additional Information About Your Visit        MyChart Information     [employer] gives you secure access to your electronic health record. If you see a primary care provider, you can also send messages to your care team and make appointments. If you have questions, please call your primary care clinic.  If you do not have a primary care provider, please call [phone] and they will assist you.        Care EveryWhere ID     This is your Care EveryWhere ID. This could be used by other organizations to access your [place] medical records  [id number]        Your Vitals Were     Pulse Temperature Pulse Oximetry BMI (Body Mass Index)          75 98.1  F (36.7  C) (Oral) 98% 26.93 kg/m2         Blood Pressure from Last 3 Encounters:   11/15/18 132/84   09/27/18 132/82   08/11/18 110/76    Weight from Last 3 Encounters:   11/15/18 97.1 kg (214 lb)   09/27/18 96.2 kg (212 lb)   08/11/18 98 kg (216 lb)              We Performed the Following     ORTHOPEDICS ADULT REFERRAL     XR Hip Left 2-3 Views        Primary Care Provider Office Phone # Fax #    JOVANY Nowak [phone]       [address]  [facility] [address]        Equal Access to Services     BETTY BLOOM : Hadii aad ku hadasho Soomaali, waaxda luqadaha, qaybta kaalmada adeegyada, waxay idiin hayaan leland de jesus . So [facility] [phone].    ATENCIÓN: Si habla español, tiene a wilkinson disposición servicios gratuitos de asistencia lingüística. Juliana al [phone].    We comply with applicable federal civil rights laws and [place] laws. We do not discriminate on the basis of race, color, national origin, age, disability, sex, sexual orientation, or gender identity.            Thank you!     Thank you for choosing [facility]  for your care. Our goal is always to provide you with excellent care. Hearing back from our patients is  one way we can continue to improve our services. Please take a few minutes to complete the written survey that you may receive in the mail after your visit with us. Thank you!             Your Updated Medication List - Protect others around you: Learn how to safely use, store and throw away your medicines at www.disposemymeds.org.          This list is accurate as of 11/15/18 11:59 PM.  Always use your most recent med list.                   Brand Name Dispense Instructions for use Diagnosis    finasteride 1 MG tablet    PROPECIA    90 tablet    Take 1 tablet (1 mg) by mouth daily    Hair loss       fluticasone 50 MCG/ACT spray    FLONASE    1 Package    Spray 1-2 sprays into both nostrils daily.    Allergic rhinitis, cause unspecified       ibuprofen 800 MG tablet    ADVIL/MOTRIN     TAKE 1 TABLET BY MOUTH 3 TIMES DAILY AS NEEDED

## 2018-11-15 NOTE — PROGRESS NOTES
SUBJECTIVE:   Cory Mcpherson is a 38 year old male who presents to clinic today for the following health issues:    Concern - groin pain  Onset: end of July    Description:   Running into first base - hit based with left leg extended - thigh engaged - experience pain in the groin.  Started left groin, by end game.  Pain for weeks. 2 months continued.  Office Olympics a month ago - no immediate pain.  Running now -treadmills increased pain in the last 3 weeks.     Intensity: mild    Progression of Symptoms:  worsening    Accompanying Signs & Symptoms:  Denies weakness, numbness or tingling in the leg    Previous history of similar problem:   none    Precipitating factors:   Worsened by: running    Alleviating factors:  Improved by: rest    Therapies Tried and outcome: nothing.         Baseball - injury  Initially groin pain bilaterally  Then developed  Left sided groin pain     Running 4-7 miles but pain   A week ago faster running      Sister just had bilateral hip replacement        Problem list and histories reviewed & adjusted, as indicated.  Additional history: as documented    Patient Active Problem List   Diagnosis     Undiagnosed cardiac murmurs     Other acne     Health Care Home     ACP (advance care planning)     Past Surgical History:   Procedure Laterality Date     NO HISTORY OF SURGERY         Social History   Substance Use Topics     Smoking status: Never Smoker     Smokeless tobacco: Never Used     Alcohol use 1.2 oz/week     2 Standard drinks or equivalent per week     Family History   Problem Relation Age of Onset     Hypertension Mother      High cholesterol Father      C.A.D. Father      stents     Cancer Paternal Grandmother      lung     Cancer Paternal Grandfather      lung     Diabetes No family hx of      Cancer - colorectal No family hx of      Prostate Cancer No family hx of          Current Outpatient Prescriptions   Medication Sig Dispense Refill     finasteride (PROPECIA) 1 MG tablet  Take 1 tablet (1 mg) by mouth daily 90 tablet 3     fluticasone (FLONASE) 50 MCG/ACT nasal spray Spray 1-2 sprays into both nostrils daily. 1 Package 11     ibuprofen (ADVIL/MOTRIN) 800 MG tablet TAKE 1 TABLET BY MOUTH 3 TIMES DAILY AS NEEDED  0     Allergies   Allergen Reactions     Grass      No Known Drug Allergies      BP Readings from Last 3 Encounters:   11/15/18 132/84   09/27/18 132/82   08/11/18 110/76    Wt Readings from Last 3 Encounters:   11/15/18 97.1 kg (214 lb)   09/27/18 96.2 kg (212 lb)   08/11/18 98 kg (216 lb)                    Reviewed and updated as needed this visit by clinical staff  Tobacco  Allergies       Reviewed and updated as needed this visit by Provider         ROS:  Constitutional, HEENT, cardiovascular, pulmonary, gi and gu systems are negative, except as otherwise noted.    OBJECTIVE:     /84 (BP Location: Right arm, Patient Position: Sitting, Cuff Size: Adult Large)  Pulse 75  Temp 98.1  F (36.7  C) (Oral)  Wt 97.1 kg (214 lb)  SpO2 98%  BMI 26.93 kg/m2  Body mass index is 26.93 kg/(m^2).     GENERAL: healthy, alert and no distress  CV: regular rates and rhythm, normal S1 S2, no S3 or S4 and no murmur, click or rub  ABDOMEN: soft, nontender, no hepatosplenomegaly, no masses and bowel sounds normal  MS: no gross musculoskeletal defects noted, no edema  Yecenia Neg.  SKIN: no suspicious lesions or rashes  NEURO: Normal strength and tone, mentation intact and speech normal  PSYCH: mentation appears normal, affect normal/bright    (male): normal external genitalia without lesions or urethral discharge,  No testicular tenderness, no inguinal or femoral hernia bilaterally.    Xray : HISTORY: Left groin pain injury in July. Evaluate calcification of the left superior acetabulum and slight decrease in  space of left hip joint.  FINDINGS: No evidence of acute fracture or traumatic subluxation. Ossific densities are present along the anterosuperior  aspect of the bilateral  acetabula compatible with os acetabula. There is suggestion of bilateral CAM morphology of the  bilateral femoral head neck junctions which potentially increases risk of clinical diagnosis of femoral acetabular  impingement. Sacroiliac joints are unremarkable. Soft tissues are unremarkable.  IMPRESSION: No evidence of fracture or dislocation.      ASSESSMENT/PLAN:     (R10.32) Left groin pain  (primary encounter diagnosis)  Comment: new  Plan: XR Hip Left 2-3 Views      I reviewed Xray with Dr. James at [facility]  He will see pt and then order further imaging if indicated    Pt will call to schedule appt with JOVANY Cade  [facility]

## 2018-11-29 ENCOUNTER — TRANSFERRED RECORDS (OUTPATIENT)
Dept: HEALTH INFORMATION MANAGEMENT | Facility: CLINIC | Age: 38
End: 2018-11-29

## 2018-12-06 ENCOUNTER — TRANSFERRED RECORDS (OUTPATIENT)
Dept: FAMILY MEDICINE | Facility: CLINIC | Age: 38
End: 2018-12-06

## 2018-12-07 ENCOUNTER — MYC MEDICAL ADVICE (OUTPATIENT)
Dept: FAMILY MEDICINE | Facility: CLINIC | Age: 38
End: 2018-12-07

## 2018-12-07 NOTE — TELEPHONE ENCOUNTER
From: Cory Mcpherson  To: Francoise Contreras PA  Sent: 12/7/2018 11:16 AM CST  Subject: Updates about my health    Francoise,    Got your message. [facility] did MRI per Dr James. However, i went to see Dr Duque for the follow up. He's who did both my sisters hips. They found quite a mess of things. The bone deform issue, and a variety of tissue issues. So it sounds like all the possibilities were the result. 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 Surgery is required to fix the latter. but i can work thorough the tissue stuff for now. Probably will not get worked on until next fall if i can make it that long with the aches and pain.

## 2018-12-18 ENCOUNTER — TRANSFERRED RECORDS (OUTPATIENT)
Dept: FAMILY MEDICINE | Facility: CLINIC | Age: 38
End: 2018-12-18

## 2019-03-27 ENCOUNTER — TRANSFERRED RECORDS (OUTPATIENT)
Dept: FAMILY MEDICINE | Facility: CLINIC | Age: 39
End: 2019-03-27

## 2019-04-24 ENCOUNTER — OFFICE VISIT (OUTPATIENT)
Dept: SURGERY | Facility: CLINIC | Age: 39
End: 2019-04-24
Payer: COMMERCIAL

## 2019-04-24 VITALS
DIASTOLIC BLOOD PRESSURE: 88 MMHG | RESPIRATION RATE: 16 BRPM | BODY MASS INDEX: 26.61 KG/M2 | HEIGHT: 75 IN | SYSTOLIC BLOOD PRESSURE: 136 MMHG | HEART RATE: 77 BPM | OXYGEN SATURATION: 98 % | WEIGHT: 214 LBS

## 2019-04-24 DIAGNOSIS — R10.32 LEFT GROIN PAIN: Primary | ICD-10-CM

## 2019-04-24 PROCEDURE — 99203 OFFICE O/P NEW LOW 30 MIN: CPT | Performed by: SURGERY

## 2019-04-24 ASSESSMENT — MIFFLIN-ST. JEOR: SCORE: 1971.33

## 2019-04-25 NOTE — PROGRESS NOTES
[facility]  Surgery Consultation    Primary care provider:  Francoise Contreras [phone]  Consultation requested by: Elio Duque MD    HPI: This patient is a 38-year-old gentleman referred by the above-mentioned orthopedic provider for consultation regarding left groin pain.  He states that he sustained an injury while playing baseball last year.  He describes an episode where he was running to first base and he planted his left leg into the bag and felt an immediate pop or tear in the left groin area.  He was unable to play thereafter.  He rested this for period of time and noted some improvement in symptoms.  He has continued to do some working out including running and other exercise.  That said he continues to have pain within his left groin that is sharp in nature and radiates down his thigh.  This is associated particularly with explosive and lateral movements.  He has no pain on coughing or sneezing.  He states that his left leg feels weaker compared to his right.  He has no testicular discomfort.  He did undergo imaging that showed some intrinsic abnormalities in both of his hips.    PMH:   has a past medical history of Essential hypertension, benign (8/4/2008) and Other acne.  PSH:    has a past surgical history that includes no history of surgery and orthopedic surgery.  Social History:   reports that he has never smoked. He has never used smokeless tobacco. He reports that he drinks about 1.2 oz of alcohol per week. He reports that he does not use drugs.  Family History:  family history includes C.A.D. in his father; Cancer in his paternal grandfather and paternal grandmother; High cholesterol in his father; Hypertension in his mother.  Medications/Allergies: Home medications and allergies reviewed.    ROS:  The 10 point Review of Systems is negative other than noted in the HPI.    Physical Exam:  /88 (BP Location: Left arm, Cuff Size: Adult Regular)   Pulse 77   Resp  "16   Ht 1.905 m (6' 3\")   Wt 97.1 kg (214 lb)   SpO2 98%   BMI 26.75 kg/m    GENERAL: Generally appears well.  Psych: Alert and Oriented.  Normal affect  Eyes: Sclera clear  Respiratory:  Lungs clear to ausculation bilaterally with good air excursion  Cardiovascular:  Regular Rate and Rhythm with no murmurs gallops or rubs, normal peripheral pulses  GI: Abdomen Non Distended Non-Tender  No hernias palpated..  Groin- I examined the patient in both the standing and supine positions. Right Groin- No hernia Palpated.  No tenderness on palpation. Left Groin- No hernia Palpated.  No tenderness on palpation. No scrotal or testicle abnormalities.  His area of greatest discomfort is within the insertion site of the left Adductor tendonhe also has discomfort with forcedAdduction  Lymphatic/Hematologic/Immune:  No femoral or cervical lymphadenopathy.  Integumentary:  No rashes  Neurological: grossly intact     All new lab and imaging data was reviewed.     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    PLAN: I discussed the pathophysiology of this injury.  Discussed management options.  I gave him the name of 2 separate orthopedist that he could discuss tendon lengthening surgery with.  He can visit with Dr. Adam Alexander or Dr. Scotty Mckeon both of whom are with [facility] orthopedics. if there is any other change in his condition I would happily reevaluate.    Thank you very much for this consult.    Edison Jacinto M.D.  [facility]  [phone]    Please route or send letter to:  Primary Care Provider (PCP) and Referring Provider  "

## 2019-06-17 ENCOUNTER — TRANSFERRED RECORDS (OUTPATIENT)
Dept: FAMILY MEDICINE | Facility: CLINIC | Age: 39
End: 2019-06-17

## 2019-07-03 ENCOUNTER — TRANSFERRED RECORDS (OUTPATIENT)
Dept: FAMILY MEDICINE | Facility: CLINIC | Age: 39
End: 2019-07-03

## 2019-07-29 DIAGNOSIS — L65.9 HAIR LOSS: ICD-10-CM

## 2019-07-29 RX ORDER — FINASTERIDE 1 MG/1
1 TABLET, FILM COATED ORAL DAILY
Qty: 30 TABLET | Refills: 0 | COMMUNITY
Start: 2019-07-29

## 2019-07-29 NOTE — TELEPHONE ENCOUNTER
Refused Prescriptions:                       Disp   Refills    finasteride (PROPECIA) 1 MG tablet         30 tab*0        Sig: Take 1 tablet (1 mg) by mouth daily  Refused By: AURA TEJEDA  Reason for Refusal: Request already responded to by other means (phone, fax, etc.)  Reason for Refusal Comment: called in 30 today 7-    pt called the clinical support line with the following;    Pt called to say that he is going out of town. Needs a short supply of medication. I told pt that I would call in a 30 day to [facility] . Pt was told to call back before his medicaton runs out, to schedule a fasting ov. Last one was 8-  Aura  [phone]

## 2019-08-20 ENCOUNTER — OFFICE VISIT (OUTPATIENT)
Dept: FAMILY MEDICINE | Facility: CLINIC | Age: 39
End: 2019-08-20

## 2019-08-20 VITALS
DIASTOLIC BLOOD PRESSURE: 88 MMHG | HEART RATE: 86 BPM | BODY MASS INDEX: 27.65 KG/M2 | SYSTOLIC BLOOD PRESSURE: 126 MMHG | WEIGHT: 222.4 LBS | OXYGEN SATURATION: 98 % | HEIGHT: 75 IN | TEMPERATURE: 98.2 F

## 2019-08-20 DIAGNOSIS — L65.9 HAIR LOSS: Primary | ICD-10-CM

## 2019-08-20 DIAGNOSIS — J30.2 SEASONAL ALLERGIC RHINITIS, UNSPECIFIED TRIGGER: ICD-10-CM

## 2019-08-20 DIAGNOSIS — E66.3 OVERWEIGHT (BMI 25.0-29.9): ICD-10-CM

## 2019-08-20 PROCEDURE — 99213 OFFICE O/P EST LOW 20 MIN: CPT | Performed by: PHYSICIAN ASSISTANT

## 2019-08-20 RX ORDER — FINASTERIDE 1 MG/1
1 TABLET, FILM COATED ORAL DAILY
Qty: 90 TABLET | Refills: 3 | Status: SHIPPED | OUTPATIENT
Start: 2019-08-20 | End: 2020-08-07

## 2019-08-20 ASSESSMENT — MIFFLIN-ST. JEOR: SCORE: 2004.43

## 2019-08-20 NOTE — PROGRESS NOTES
Subjective     Cory Mcpherson is a 39 year old male who presents to clinic today for the following health issues:    HPI       Propecia - working well - requesting refills      Flonase - spring and fall.  Bad reaction to bug bites - will take 1/2 Benadryl      HIV screening - did 23 and me      HTN history  In 20s  Lost weight  Mother with HTN    Wt Readings from Last 5 Encounters:   08/20/19 100.9 kg (222 lb 6.4 oz)   04/24/19 97.1 kg (214 lb)   11/15/18 97.1 kg (214 lb)   09/27/18 96.2 kg (212 lb)   08/11/18 98 kg (216 lb)     Body mass index is 27.8 kg/m .      BP Readings from Last 6 Encounters:   08/20/19 126/88   04/24/19 136/88   11/15/18 132/84   09/27/18 132/82   08/11/18 110/76   11/14/17 139/85         Patient Active Problem List   Diagnosis     Undiagnosed cardiac murmurs     Health Care Home     ACP (advance care planning)     Past Surgical History:   Procedure Laterality Date     NO HISTORY OF SURGERY       ORTHOPEDIC SURGERY         Social History     Tobacco Use     Smoking status: Never Smoker     Smokeless tobacco: Never Used   Substance Use Topics     Alcohol use: Yes     Alcohol/week: 1.2 oz     Types: 2 Standard drinks or equivalent per week     Family History   Problem Relation Age of Onset     Hypertension Mother      High cholesterol Father      C.A.D. Father         stents     Cancer Paternal Grandmother         lung     Cancer Paternal Grandfather         lung     Diabetes No family hx of      Cancer - colorectal No family hx of      Prostate Cancer No family hx of            Current Outpatient Medications   Medication Sig Dispense Refill     finasteride (PROPECIA) 1 MG tablet Take 1 tablet (1 mg) by mouth daily 90 tablet 3     fluticasone (FLONASE) 50 MCG/ACT nasal spray Spray 1-2 sprays into both nostrils daily. 1 Package 11     ibuprofen (ADVIL/MOTRIN) 800 MG tablet TAKE 1 TABLET BY MOUTH 3 TIMES DAILY AS NEEDED  0     Allergies   Allergen Reactions     Grass      Recent Labs   Lab  "Test 08/11/18  1045 11/14/17  1058 01/22/13  0915   *  --  118   HDL 49  --  49   TRIG 82  --  76   ALT 28 58 53   CR 1.05  --  0.95   GFRESTIMATED 89  --  105   POTASSIUM 5.0  --  4.6        BP Readings from Last 3 Encounters:   08/20/19 126/88   04/24/19 136/88   11/15/18 132/84    Wt Readings from Last 3 Encounters:   08/20/19 100.9 kg (222 lb 6.4 oz)   04/24/19 97.1 kg (214 lb)   11/15/18 97.1 kg (214 lb)                        Review of Systems       ROS COMP: Constitutional, HEENT, cardiovascular, pulmonary, gi and gu systems are negative, except as otherwise noted.          Objective    /88 (BP Location: Left arm, Patient Position: Sitting, Cuff Size: Adult Large)   Pulse 86   Temp 98.2  F (36.8  C) (Oral)   Ht 1.905 m (6' 3\")   Wt 100.9 kg (222 lb 6.4 oz)   SpO2 98%   BMI 27.80 kg/m    Body mass index is 27.8 kg/m .  Physical Exam   GENERAL: healthy, alert and no distress  EYES: Eyes grossly normal to inspection, PERRL and conjunctivae and sclerae normal  HENT: ear canals and TM's normal, nose and mouth without ulcers or lesions  NECK: no adenopathy, no asymmetry, masses, or scars and thyroid normal to palpation  RESP: lungs clear to auscultation - no rales, rhonchi or wheezes  CV: regular rate and rhythm, normal S1 S2, no S3 or S4, no murmur, click or rub, no peripheral edema and peripheral pulses strong  MS: no gross musculoskeletal defects noted, no edema  SKIN: no suspicious lesions or rashes  NEURO: Normal strength and tone, mentation intact and speech normal  PSYCH: mentation appears normal, affect normal/bright    Hair: no baldness appreciated              Assessment & Plan     1. Hair loss  Advised Cat X, women should not handle  - finasteride (PROPECIA) 1 MG tablet; Take 1 tablet (1 mg) by mouth daily  Dispense: 90 tablet; Refill: 3    2. Overweight (BMI 25.0-29.9)      3. Seasonal allergic rhinitis, unspecified trigger         BMI:   Estimated body mass index is 26.75 kg/m  as " "calculated from the following:    Height as of 4/24/19: 1.905 m (6' 3\").    Weight as of 4/24/19: 97.1 kg (214 lb).   Weight management plan: Discussed healthy diet and exercise guidelines     Follow-up 1 year complete fasting physical              JOVANY Nowak  [facility]        "

## 2019-08-20 NOTE — NURSING NOTE
Cory is here today for a med recheck, non-fasting.    Pre-visit Screening:  Immunizations:  up to date  Colonoscopy:  NA  Mammogram: NA  Asthma Action Test/Plan:  NA  PHQ9:  PHQ 2 done today  GAD7:  NA  Questioned patient about current smoking habits Pt. has never smoked.  Ok to leave detailed message on voice mail for today's visit only Yes, phone # [phone]

## 2019-09-30 ENCOUNTER — HEALTH MAINTENANCE LETTER (OUTPATIENT)
Age: 39
End: 2019-09-30

## 2020-07-31 DIAGNOSIS — L65.9 HAIR LOSS: ICD-10-CM

## 2020-07-31 RX ORDER — FINASTERIDE 1 MG/1
TABLET, FILM COATED ORAL
Qty: 90 TABLET | Refills: 3 | COMMUNITY
Start: 2020-07-31

## 2020-07-31 NOTE — TELEPHONE ENCOUNTER
Pt last seen 8/20/2019 to follow up in a year. Pt is due for OV before further refills. Once patient has made an appt, we can call in a short supply to get him through until appt.        Refused Prescriptions:                       Disp   Refills    finasteride (PROPECIA) 1 MG tablet [Pharma*90 tab*3        Sig: TAKE 1 TABLET BY MOUTH EVERY DAY

## 2020-08-06 NOTE — PROGRESS NOTES
Subjective     Cory Mcpherson is a 40 year old adult who presents to clinic today for recheck on chronic medical conditions:     HPI   Here for finasteride refills    Flonase - summer      BP high today - previously on lisinopril    BP Readings from Last 6 Encounters:   08/07/20 (!) 140/90   08/20/19 126/88   04/24/19 136/88   11/15/18 132/84   09/27/18 132/82   08/11/18 110/76       Exercising.      Has rash on back.  Intermittently gets yeast infections.    Has occ. Pain at tip of glans of penis - usually after bike riding  Has intermittent jock itch  Sexually active new partners, not always using condoms  No dysuria.         Patient Active Problem List   Diagnosis     Health Care Home     ACP (advance care planning)     Overweight (BMI 25.0-29.9)     Hair loss     Seasonal allergic rhinitis, unspecified trigger     Past Surgical History:   Procedure Laterality Date     NO HISTORY OF SURGERY       ORTHOPEDIC SURGERY         Social History     Tobacco Use     Smoking status: Never Smoker     Smokeless tobacco: Never Used   Substance Use Topics     Alcohol use: Yes     Alcohol/week: 2.0 standard drinks     Types: 2 Standard drinks or equivalent per week     Family History   Problem Relation Age of Onset     Hypertension Mother      High cholesterol Father      C.A.D. Father         stents     Hypertension Father      Cancer Paternal Grandmother         lung     Cancer Paternal Grandfather         lung     Diabetes No family hx of      Cancer - colorectal No family hx of      Prostate Cancer No family hx of            Current Outpatient Medications   Medication Sig Dispense Refill     finasteride (PROPECIA) 1 MG tablet Take 1 tablet (1 mg) by mouth daily 90 tablet 3     fluticasone (FLONASE) 50 MCG/ACT nasal spray Spray 1-2 sprays into both nostrils daily. 1 Package 11     ibuprofen (ADVIL/MOTRIN) 800 MG tablet TAKE 1 TABLET BY MOUTH 3 TIMES DAILY AS NEEDED  0     Allergies   Allergen Reactions     Grass       Recent Labs   Lab Test 08/11/18  1045 11/14/17  1058 01/22/13  0915   *  --  118   HDL 49  --  49   TRIG 82  --  76   ALT 28 58 53   CR 1.05  --  0.95   GFRESTIMATED 89  --  105   POTASSIUM 5.0  --  4.6        BP Readings from Last 3 Encounters:   08/07/20 (!) 140/90   08/20/19 126/88   04/24/19 136/88    Wt Readings from Last 3 Encounters:   08/07/20 100.1 kg (220 lb 9.6 oz)   08/20/19 100.9 kg (222 lb 6.4 oz)   04/24/19 97.1 kg (214 lb)                             Review of Systems     CONSTITUTIONAL:NEGATIVE for fever, chills, change in weight  EYES: NEGATIVE for vision changes or irritation  ENT/MOUTH: NEGATIVE for ear, mouth and throat problems  RESP:NEGATIVE for significant cough or SOB  CV: NEGATIVE for chest pain, palpitations or peripheral edema  GI: NEGATIVE for nausea, abdominal pain, heartburn, or change in bowel habits  NEURO: NEGATIVE for weakness, dizziness or paresthesias  ENDOCRINE: NEGATIVE for temperature intolerance, skin/hair changes  PSYCHIATRIC: NEGATIVE for changes in mood or affect          Objective    BP (!) 140/90 (BP Location: Left arm, Patient Position: Sitting, Cuff Size: Adult Large)   Pulse 87   Temp 98  F (36.7  C) (Oral)   Wt 100.1 kg (220 lb 9.6 oz)   SpO2 99%   BMI 27.57 kg/m    Body mass index is 27.57 kg/m .    Physical Exam     GENERAL: healthy, alert and no distress  RESP: lungs clear to auscultation - no rales, rhonchi or wheezes  CV: regular rate and rhythm, normal S1 S2, no S3 or S4, no murmur, click or rub, no peripheral edema and peripheral pulses strong  (male): normal external genitalia without lesions or urethral discharge  Skin: hypopigmented macules on upper back            Assessment & Plan     1. Hair loss    - finasteride (PROPECIA) 1 MG tablet; Take 1 tablet (1 mg) by mouth daily  Dispense: 90 tablet; Refill: 3    2. Screen for STD (sexually transmitted disease)  Advised condoms  - RPR W/REFLEX TITER & CONFIRM  - Chlam Trach/N. Gonorrhoeae RNA  "TMA([facility]  - Hepatitis C antibody  - HIV 1/2 Agn Clara 4th Gen w Reflex ([facility])  - VENOUS COLLECTION    3. Tinea versicolor    - fluconazole (DIFLUCAN) 100 MG tablet; Take 3 tablets at once an repeat 7 days later  Dispense: 6 tablet; Refill: 0    4. (R03.0) Elevated blood pressure reading without diagnosis of hypertension  Plan: Handout provided  RTC 3 months recheck         BMI:   Estimated body mass index is 27.8 kg/m  as calculated from the following:    Height as of 8/20/19: 1.905 m (6' 3\").    Weight as of 8/20/19: 100.9 kg (222 lb 6.4 oz).   Weight management plan: Discussed healthy diet and exercise guidelines        Work on weight loss  Regular exercise      JOVANY Nowak  [facility]        "

## 2020-08-07 ENCOUNTER — OFFICE VISIT (OUTPATIENT)
Dept: FAMILY MEDICINE | Facility: CLINIC | Age: 40
End: 2020-08-07

## 2020-08-07 VITALS
WEIGHT: 220.6 LBS | TEMPERATURE: 98 F | DIASTOLIC BLOOD PRESSURE: 90 MMHG | HEART RATE: 87 BPM | OXYGEN SATURATION: 99 % | SYSTOLIC BLOOD PRESSURE: 140 MMHG | BODY MASS INDEX: 27.57 KG/M2

## 2020-08-07 DIAGNOSIS — Z11.3 SCREEN FOR STD (SEXUALLY TRANSMITTED DISEASE): ICD-10-CM

## 2020-08-07 DIAGNOSIS — R03.0 ELEVATED BLOOD PRESSURE READING WITHOUT DIAGNOSIS OF HYPERTENSION: ICD-10-CM

## 2020-08-07 DIAGNOSIS — B36.0 TINEA VERSICOLOR: ICD-10-CM

## 2020-08-07 DIAGNOSIS — L65.9 HAIR LOSS: Primary | ICD-10-CM

## 2020-08-07 PROCEDURE — 36415 COLL VENOUS BLD VENIPUNCTURE: CPT | Performed by: PHYSICIAN ASSISTANT

## 2020-08-07 PROCEDURE — 87491 CHLMYD TRACH DNA AMP PROBE: CPT | Mod: 90 | Performed by: PHYSICIAN ASSISTANT

## 2020-08-07 PROCEDURE — 99213 OFFICE O/P EST LOW 20 MIN: CPT | Performed by: PHYSICIAN ASSISTANT

## 2020-08-07 PROCEDURE — 86592 SYPHILIS TEST NON-TREP QUAL: CPT | Mod: 90 | Performed by: PHYSICIAN ASSISTANT

## 2020-08-07 PROCEDURE — 87591 N.GONORRHOEAE DNA AMP PROB: CPT | Mod: 90 | Performed by: PHYSICIAN ASSISTANT

## 2020-08-07 PROCEDURE — 86803 HEPATITIS C AB TEST: CPT | Mod: 90 | Performed by: PHYSICIAN ASSISTANT

## 2020-08-07 PROCEDURE — 87389 HIV-1 AG W/HIV-1&-2 AB AG IA: CPT | Mod: 90 | Performed by: PHYSICIAN ASSISTANT

## 2020-08-07 RX ORDER — FLUCONAZOLE 100 MG/1
TABLET ORAL
Qty: 6 TABLET | Refills: 0 | Status: SHIPPED | OUTPATIENT
Start: 2020-08-07 | End: 2023-07-11

## 2020-08-07 RX ORDER — FINASTERIDE 1 MG/1
1 TABLET, FILM COATED ORAL DAILY
Qty: 90 TABLET | Refills: 3 | Status: SHIPPED | OUTPATIENT
Start: 2020-08-07

## 2020-08-07 NOTE — NURSING NOTE
Cory is here for med check and derm check    Pre-visit Screening:  Immunizations:  up to date  Colonoscopy:  NA  Mammogram: NA  Asthma Action Test/Plan:  NA  PHQ9:  NA  GAD7:  NA  Questioned patient about current smoking habits Pt. has never smoked.  Ok to leave detailed message on voice mail for today's visit only Yes, phone # [phone]

## 2020-08-08 LAB
CHLAMYDIA TRACHOMATIS RNA, TMA - QUEST: NOT DETECTED
NEISSERIA GONORRHOEAE RNA TMA: NOT DETECTED
SEE NOTE - QUEST: NORMAL

## 2020-08-11 LAB
HCV AB - QUEST: NORMAL
HIV 1/2 AGN ABY 4TH GEN WITH REFLEX: NORMAL
RPR SCREEN - QUEST: NORMAL
SIGNAL TO CUT OFF - QUEST: 0.09

## 2020-11-05 NOTE — NURSING NOTE
Cory is here for a possible sports hernia          Pre-visit Screening:  Immunizations:  up to date  Colonoscopy:  NA  Mammogram: NA  Asthma Action Test/Plan:  NA  PHQ9:  none  GAD7:  none  Questioned patient about current smoking habits Pt. has never smoked.  Ok to leave detailed message on voice mail for today's visit only Yes, phone # [phone]       Never smoker

## 2020-11-20 ENCOUNTER — TELEPHONE (OUTPATIENT)
Dept: FAMILY MEDICINE | Facility: CLINIC | Age: 40
End: 2020-11-20

## 2020-11-20 NOTE — TELEPHONE ENCOUNTER
Received RAINA electronically signed, can not release records, need manually signed release.     Faxed back stating invalid release.

## 2020-12-02 ENCOUNTER — TELEPHONE (OUTPATIENT)
Dept: FAMILY MEDICINE | Facility: CLINIC | Age: 40
End: 2020-12-02

## 2020-12-02 NOTE — TELEPHONE ENCOUNTER
Records printed, invoice in the amount of $39.64 faxed to "[employer]    waiting on payment to release records.

## 2021-01-15 ENCOUNTER — HEALTH MAINTENANCE LETTER (OUTPATIENT)
Age: 41
End: 2021-01-15

## 2021-07-01 DIAGNOSIS — L65.9 HAIR LOSS: ICD-10-CM

## 2021-07-01 RX ORDER — FINASTERIDE 1 MG/1
TABLET, FILM COATED ORAL
Qty: 90 TABLET | Refills: 3 | COMMUNITY
Start: 2021-07-01

## 2021-07-01 NOTE — TELEPHONE ENCOUNTER
Pt will need OV in 8/2021    Refused Prescriptions:                       Disp   Refills    finasteride (PROPECIA) 1 MG tablet [Pharma*90 tab*3        Sig: TAKE 1 TABLET BY MOUTH EVERY DAY  Refused By: JANI JOHNSON  Reason for Refusal: OTHER  Reason for Refusal Comment: pt will ne OV before refills

## 2021-08-18 DIAGNOSIS — L65.9 HAIR LOSS: ICD-10-CM

## 2021-08-18 RX ORDER — FINASTERIDE 1 MG/1
TABLET, FILM COATED ORAL
Qty: 90 TABLET | Refills: 3 | COMMUNITY
Start: 2021-08-18

## 2021-08-18 NOTE — TELEPHONE ENCOUNTER
Denied refill request for finasteride. Pt is due for an annual med check/office visit.  Does not need to fast unless he'd like to get fasting blood work.  Will send a [employer] message to inform pt.

## 2021-10-24 ENCOUNTER — HEALTH MAINTENANCE LETTER (OUTPATIENT)
Age: 41
End: 2021-10-24

## 2022-02-13 ENCOUNTER — HEALTH MAINTENANCE LETTER (OUTPATIENT)
Age: 42
End: 2022-02-13

## 2022-07-21 ENCOUNTER — TRANSFERRED RECORDS (OUTPATIENT)
Dept: FAMILY MEDICINE | Facility: CLINIC | Age: 42
End: 2022-07-21

## 2022-10-16 ENCOUNTER — HEALTH MAINTENANCE LETTER (OUTPATIENT)
Age: 42
End: 2022-10-16

## 2022-10-31 ENCOUNTER — TRANSFERRED RECORDS (OUTPATIENT)
Dept: FAMILY MEDICINE | Facility: CLINIC | Age: 42
End: 2022-10-31

## 2022-11-02 ENCOUNTER — TRANSFERRED RECORDS (OUTPATIENT)
Dept: FAMILY MEDICINE | Facility: CLINIC | Age: 42
End: 2022-11-02

## 2022-12-07 ENCOUNTER — TRANSFERRED RECORDS (OUTPATIENT)
Dept: FAMILY MEDICINE | Facility: CLINIC | Age: 42
End: 2022-12-07

## 2023-03-14 ENCOUNTER — TELEPHONE (OUTPATIENT)
Dept: FAMILY MEDICINE | Facility: CLINIC | Age: 43
End: 2023-03-14

## 2023-03-26 ENCOUNTER — HEALTH MAINTENANCE LETTER (OUTPATIENT)
Age: 43
End: 2023-03-26

## 2023-07-11 ENCOUNTER — OFFICE VISIT (OUTPATIENT)
Dept: FAMILY MEDICINE | Facility: CLINIC | Age: 43
End: 2023-07-11

## 2023-07-11 VITALS
OXYGEN SATURATION: 98 % | TEMPERATURE: 98.2 F | BODY MASS INDEX: 25.36 KG/M2 | SYSTOLIC BLOOD PRESSURE: 128 MMHG | WEIGHT: 204 LBS | HEART RATE: 85 BPM | DIASTOLIC BLOOD PRESSURE: 82 MMHG | HEIGHT: 75 IN

## 2023-07-11 DIAGNOSIS — Z23 NEED FOR VACCINATION: ICD-10-CM

## 2023-07-11 DIAGNOSIS — Z13.220 LIPID SCREENING: ICD-10-CM

## 2023-07-11 DIAGNOSIS — Z13.1 DIABETES MELLITUS SCREENING: ICD-10-CM

## 2023-07-11 DIAGNOSIS — Z00.00 ROUTINE PHYSICAL EXAMINATION: Primary | ICD-10-CM

## 2023-07-11 PROCEDURE — 99396 PREV VISIT EST AGE 40-64: CPT | Mod: 25 | Performed by: STUDENT IN AN ORGANIZED HEALTH CARE EDUCATION/TRAINING PROGRAM

## 2023-07-11 PROCEDURE — 90715 TDAP VACCINE 7 YRS/> IM: CPT | Performed by: STUDENT IN AN ORGANIZED HEALTH CARE EDUCATION/TRAINING PROGRAM

## 2023-07-11 PROCEDURE — 90471 IMMUNIZATION ADMIN: CPT | Performed by: STUDENT IN AN ORGANIZED HEALTH CARE EDUCATION/TRAINING PROGRAM

## 2023-07-11 NOTE — PROGRESS NOTES
SUBJECTIVE:   CC: Cory Mcpherson is an 43 year old male who presents for preventive health visit.     Patient has been advised of split billing requirements and indicates understanding: Yes    Nursing Notes:   Yamilex Ogden CMA  7/11/2023  9:19 AM  Signed  Chief Complaint   Patient presents with     Physical     Fasting today      Hypertension     Concerned about elevated BP      Pre-visit Screening:  Immunizations:  not up to date - tdap today  Colonoscopy:  NA  Mammogram: NA  Asthma Action Test/Plan:  NA  PHQ9:  NA  GAD7:  NA  Questioned patient about current smoking habits Pt. has never smoked.  Ok to leave detailed message on voice mail for today's visit only Yes, phone # [phone]      Healthy Habits:    General health: good, partial keto diet, mountain biking    Sleep: night owl, no concerns     Mental Health: no concerns      10 and 14yo PneumRx      Problems taking medications regularly No    Medication side effects: No    Have you had an eye exam in the past two years? yes    Do you see a dentist twice per year? yes    Do you have sleep apnea, excessive snoring or daytime drowsiness?no      Today's PHQ-2 Score:       7/11/2023     9:08 AM 8/20/2019     9:03 AM   PHQ-2 ( 1999 [employer])   Q1: Little interest or pleasure in doing things 0 0   Q2: Feeling down, depressed or hopeless 0 0   PHQ-2 Score 0 0   PHQ-2 Total Score (12-17 Years)- Positive if 3 or more points; Administer PHQ-A if positive  0       Do you feel safe in your environment? Yes    Have you ever done Advance Care Planning? (For example, a Health Directive, POLST, or a discussion with a medical provider or your loved ones about your wishes): No, advance care planning information given to patient to review.  Patient declined advance care planning discussion at this time.    Social History     Tobacco Use     Smoking status: Never     Passive exposure: Never     Smokeless tobacco: Never   Substance Use Topics  "    Alcohol use: Yes     Alcohol/week: 2.0 standard drinks of alcohol     Types: 2 Standard drinks or equivalent per week     Comment: socially     If you drink alcohol do you typically have >3 drinks per day or >7 drinks per week? No                      Last PSA: No results found for: PSA    Reviewed orders with patient. Reviewed health maintenance and updated orders accordingly - Yes  Lab work is in process  Labs reviewed in EPIC  BP Readings from Last 3 Encounters:   07/11/23 128/82   08/07/20 (!) 140/90   08/20/19 126/88    Wt Readings from Last 3 Encounters:   07/11/23 92.5 kg (204 lb)   08/07/20 100.1 kg (220 lb 9.6 oz)   08/20/19 100.9 kg (222 lb 6.4 oz)                    Reviewed and updated as needed this visit by clinical staff   Tobacco  Allergies   Problems             Reviewed and updated as needed this visit by Provider                 Past Medical History:   Diagnosis Date     Essential hypertension, benign 8/4/2008     Other acne       Past Surgical History:   Procedure Laterality Date     ORTHOPEDIC SURGERY      shoulder and thumb     Family History   Problem Relation Age of Onset     Hypertension Mother      Coronary Artery Disease Father         multiple stents     Hyperlipidemia Father      High cholesterol Father      C.A.D. Father         stents     Hypertension Father      Cancer Paternal Grandmother         lung     Cancer Paternal Grandfather         lung     Cancer Other         Aunt, pancreas     Diabetes No family hx of      Cancer - colorectal No family hx of      Prostate Cancer No family hx of      Colon Cancer No family hx of      Cerebrovascular Disease No family hx of        ROS:  12 point ROS performed and negative for new concerns except as mentioned above     OBJECTIVE:   /82 (BP Location: Right arm, Patient Position: Sitting, Cuff Size: Adult Large)   Pulse 85   Temp 98.2  F (36.8  C) (Temporal)   Ht 1.905 m (6' 3\")   Wt 92.5 kg (204 lb)   SpO2 98%   BMI 25.50 " "kg/m    EXAM:  GENERAL: healthy, alert and no distress  EYES: Eyes grossly normal to inspection, PERRL and conjunctivae and sclerae normal  HENT: ear canals and TM's normal, nose and mouth without ulcers or lesions  NECK: no adenopathy, no asymmetry, masses, or scars and thyroid normal to palpation  RESP: lungs clear to auscultation - no rales, rhonchi or wheezes  CV: regular rate and rhythm, normal S1 S2, no S3 or S4, no murmur, click or rub, no peripheral edema and peripheral pulses strong  ABDOMEN: soft, nontender, no hepatosplenomegaly, no masses and bowel sounds normal  MS: no gross musculoskeletal defects noted, no edema  SKIN: no suspicious lesions or rashes  NEURO: Normal strength and tone, mentation intact and speech normal  PSYCH: mentation appears normal, affect normal/bright    Diagnostic Test Results:  Labs reviewed in Epic    ASSESSMENT/PLAN:       ICD-10-CM    1. Routine physical examination  Z00.00       2. Diabetes mellitus screening  Z13.1       3. Lipid screening  Z13.220       4. Need for vaccination  Z23          Just had full lab panel for insurance physical, defer labs today  Discussed r/b of restarting lisinopril in future  Patient Instructions   Please send copy of lab results (attach to mychart or drop off)    Goal BP < 130/80    Follow-up yearly, sooner if needed      Patient has been advised of split billing requirements and indicates understanding: Yes  COUNSELING:  Reviewed preventive health counseling, as reflected in patient instructions       Regular exercise       Healthy diet/nutrition       Vision screening       Hearing screening       Immunizations       Alcohol Use        Safe sex practices/STD prevention       Colorectal cancer screening       Prostate cancer screening    Estimated body mass index is 25.5 kg/m  as calculated from the following:    Height as of this encounter: 1.905 m (6' 3\").    Weight as of this encounter: 92.5 kg (204 lb).      He reports that he has never " smoked. He has never been exposed to tobacco smoke. He has never used smokeless tobacco.      Patient Instructions   Please send copy of lab results (attach to mychart or drop off)    Goal BP < 130/80    Follow-up yearly, sooner if needed        Torrey Dean MD, [facility]

## 2023-07-11 NOTE — NURSING NOTE
Chief Complaint   Patient presents with     Physical     Fasting today      Hypertension     Concerned about elevated BP      Pre-visit Screening:  Immunizations:  not up to date - tdap today  Colonoscopy:  NA  Mammogram: NA  Asthma Action Test/Plan:  NA  PHQ9:  NA  GAD7:  NA  Questioned patient about current smoking habits Pt. has never smoked.  Ok to leave detailed message on voice mail for today's visit only Yes, phone # [phone]

## 2023-07-11 NOTE — PATIENT INSTRUCTIONS
Please send copy of lab results (attach to mychart or drop off)    Goal BP < 130/80    Follow-up yearly, sooner if needed

## 2024-10-19 ENCOUNTER — HEALTH MAINTENANCE LETTER (OUTPATIENT)
Age: 44
End: 2024-10-19

## 2025-01-11 DIAGNOSIS — L65.9 HAIR LOSS: ICD-10-CM

## 2025-01-13 RX ORDER — FINASTERIDE 1 MG/1
1 TABLET, FILM COATED ORAL DAILY
Qty: 90 TABLET | Refills: 3 | Status: SHIPPED | OUTPATIENT
Start: 2025-01-13